# Patient Record
Sex: MALE | Race: WHITE | Employment: OTHER | URBAN - METROPOLITAN AREA
[De-identification: names, ages, dates, MRNs, and addresses within clinical notes are randomized per-mention and may not be internally consistent; named-entity substitution may affect disease eponyms.]

---

## 2017-02-13 ENCOUNTER — HOSPITAL ENCOUNTER (EMERGENCY)
Age: 52
Discharge: LEFT BEFORE DISCHARGE | End: 2017-02-13
Attending: EMERGENCY MEDICINE
Payer: COMMERCIAL

## 2017-02-13 VITALS
HEIGHT: 71 IN | WEIGHT: 271.83 LBS | TEMPERATURE: 98 F | HEART RATE: 84 BPM | RESPIRATION RATE: 16 BRPM | BODY MASS INDEX: 38.06 KG/M2 | DIASTOLIC BLOOD PRESSURE: 70 MMHG | OXYGEN SATURATION: 97 % | SYSTOLIC BLOOD PRESSURE: 155 MMHG

## 2017-02-13 PROCEDURE — 75810000275 HC EMERGENCY DEPT VISIT NO LEVEL OF CARE

## 2018-07-23 ENCOUNTER — APPOINTMENT (OUTPATIENT)
Dept: GENERAL RADIOLOGY | Age: 53
End: 2018-07-23
Attending: EMERGENCY MEDICINE
Payer: COMMERCIAL

## 2018-07-23 ENCOUNTER — HOSPITAL ENCOUNTER (EMERGENCY)
Age: 53
Discharge: HOME | End: 2018-07-23
Attending: EMERGENCY MEDICINE
Payer: COMMERCIAL

## 2018-07-23 ENCOUNTER — APPOINTMENT (OUTPATIENT)
Dept: CT IMAGING | Age: 53
End: 2018-07-23
Attending: EMERGENCY MEDICINE
Payer: COMMERCIAL

## 2018-07-23 VITALS
OXYGEN SATURATION: 96 % | SYSTOLIC BLOOD PRESSURE: 133 MMHG | HEIGHT: 70 IN | BODY MASS INDEX: 44.12 KG/M2 | DIASTOLIC BLOOD PRESSURE: 73 MMHG | WEIGHT: 308.2 LBS | HEART RATE: 77 BPM | TEMPERATURE: 98.1 F | RESPIRATION RATE: 17 BRPM

## 2018-07-23 DIAGNOSIS — R06.00 DYSPNEA, UNSPECIFIED TYPE: Primary | ICD-10-CM

## 2018-07-23 LAB
ALBUMIN SERPL-MCNC: 3.2 G/DL (ref 3.5–5)
ALBUMIN/GLOB SERPL: 0.9 {RATIO} (ref 1.1–2.2)
ALP SERPL-CCNC: 54 U/L (ref 45–117)
ALT SERPL-CCNC: 20 U/L (ref 12–78)
ANION GAP SERPL CALC-SCNC: 5 MMOL/L (ref 5–15)
AST SERPL-CCNC: 11 U/L (ref 15–37)
ATRIAL RATE: 73 BPM
BASOPHILS # BLD: 0 K/UL (ref 0–0.1)
BASOPHILS NFR BLD: 0 % (ref 0–1)
BILIRUB SERPL-MCNC: 0.5 MG/DL (ref 0.2–1)
BNP SERPL-MCNC: 1184 PG/ML (ref 0–125)
BUN SERPL-MCNC: 15 MG/DL (ref 6–20)
BUN/CREAT SERPL: 21 (ref 12–20)
CALCIUM SERPL-MCNC: 8.9 MG/DL (ref 8.5–10.1)
CALCULATED P AXIS, ECG09: 22 DEGREES
CALCULATED R AXIS, ECG10: -10 DEGREES
CALCULATED T AXIS, ECG11: 44 DEGREES
CHLORIDE SERPL-SCNC: 104 MMOL/L (ref 97–108)
CK MB CFR SERPL CALC: NORMAL % (ref 0–2.5)
CK MB SERPL-MCNC: <1 NG/ML (ref 5–25)
CK SERPL-CCNC: 47 U/L (ref 39–308)
CO2 SERPL-SCNC: 30 MMOL/L (ref 21–32)
CREAT SERPL-MCNC: 0.73 MG/DL (ref 0.7–1.3)
D DIMER PPP FEU-MCNC: 1.02 MG/L FEU (ref 0–0.65)
DIAGNOSIS, 93000: NORMAL
DIFFERENTIAL METHOD BLD: ABNORMAL
EOSINOPHIL # BLD: 0.1 K/UL (ref 0–0.4)
EOSINOPHIL NFR BLD: 1 % (ref 0–7)
ERYTHROCYTE [DISTWIDTH] IN BLOOD BY AUTOMATED COUNT: 13.2 % (ref 11.5–14.5)
GLOBULIN SER CALC-MCNC: 3.4 G/DL (ref 2–4)
GLUCOSE SERPL-MCNC: 116 MG/DL (ref 65–100)
HCT VFR BLD AUTO: 35.5 % (ref 36.6–50.3)
HGB BLD-MCNC: 12.1 G/DL (ref 12.1–17)
IMM GRANULOCYTES # BLD: 0 K/UL (ref 0–0.04)
IMM GRANULOCYTES NFR BLD AUTO: 0 % (ref 0–0.5)
LYMPHOCYTES # BLD: 2.9 K/UL (ref 0.8–3.5)
LYMPHOCYTES NFR BLD: 30 % (ref 12–49)
MCH RBC QN AUTO: 30 PG (ref 26–34)
MCHC RBC AUTO-ENTMCNC: 34.1 G/DL (ref 30–36.5)
MCV RBC AUTO: 88.1 FL (ref 80–99)
MONOCYTES # BLD: 0.6 K/UL (ref 0–1)
MONOCYTES NFR BLD: 6 % (ref 5–13)
NEUTS SEG # BLD: 6.3 K/UL (ref 1.8–8)
NEUTS SEG NFR BLD: 63 % (ref 32–75)
NRBC # BLD: 0 K/UL (ref 0–0.01)
NRBC BLD-RTO: 0 PER 100 WBC
P-R INTERVAL, ECG05: 156 MS
PLATELET # BLD AUTO: 322 K/UL (ref 150–400)
PMV BLD AUTO: 9.3 FL (ref 8.9–12.9)
POTASSIUM SERPL-SCNC: 3.9 MMOL/L (ref 3.5–5.1)
PROT SERPL-MCNC: 6.6 G/DL (ref 6.4–8.2)
Q-T INTERVAL, ECG07: 362 MS
QRS DURATION, ECG06: 94 MS
QTC CALCULATION (BEZET), ECG08: 398 MS
RBC # BLD AUTO: 4.03 M/UL (ref 4.1–5.7)
SODIUM SERPL-SCNC: 139 MMOL/L (ref 136–145)
TROPONIN I SERPL-MCNC: <0.05 NG/ML
VENTRICULAR RATE, ECG03: 73 BPM
WBC # BLD AUTO: 10 K/UL (ref 4.1–11.1)

## 2018-07-23 PROCEDURE — 93005 ELECTROCARDIOGRAM TRACING: CPT

## 2018-07-23 PROCEDURE — 71046 X-RAY EXAM CHEST 2 VIEWS: CPT

## 2018-07-23 PROCEDURE — 83880 ASSAY OF NATRIURETIC PEPTIDE: CPT | Performed by: EMERGENCY MEDICINE

## 2018-07-23 PROCEDURE — 80053 COMPREHEN METABOLIC PANEL: CPT | Performed by: EMERGENCY MEDICINE

## 2018-07-23 PROCEDURE — 82553 CREATINE MB FRACTION: CPT | Performed by: EMERGENCY MEDICINE

## 2018-07-23 PROCEDURE — 85025 COMPLETE CBC W/AUTO DIFF WBC: CPT | Performed by: EMERGENCY MEDICINE

## 2018-07-23 PROCEDURE — 85379 FIBRIN DEGRADATION QUANT: CPT | Performed by: EMERGENCY MEDICINE

## 2018-07-23 PROCEDURE — 36415 COLL VENOUS BLD VENIPUNCTURE: CPT | Performed by: EMERGENCY MEDICINE

## 2018-07-23 PROCEDURE — 74011250636 HC RX REV CODE- 250/636: Performed by: EMERGENCY MEDICINE

## 2018-07-23 PROCEDURE — 84484 ASSAY OF TROPONIN QUANT: CPT | Performed by: EMERGENCY MEDICINE

## 2018-07-23 PROCEDURE — 74011636320 HC RX REV CODE- 636/320: Performed by: EMERGENCY MEDICINE

## 2018-07-23 PROCEDURE — 71275 CT ANGIOGRAPHY CHEST: CPT

## 2018-07-23 PROCEDURE — 99285 EMERGENCY DEPT VISIT HI MDM: CPT

## 2018-07-23 RX ORDER — INSULIN ASPART 100 [IU]/ML
INJECTION, SUSPENSION SUBCUTANEOUS
COMMUNITY
End: 2018-11-12

## 2018-07-23 RX ORDER — SODIUM CHLORIDE 0.9 % (FLUSH) 0.9 %
10 SYRINGE (ML) INJECTION
Status: COMPLETED | OUTPATIENT
Start: 2018-07-23 | End: 2018-07-23

## 2018-07-23 RX ORDER — SODIUM CHLORIDE 9 MG/ML
50 INJECTION, SOLUTION INTRAVENOUS
Status: COMPLETED | OUTPATIENT
Start: 2018-07-23 | End: 2018-07-23

## 2018-07-23 RX ADMIN — SODIUM CHLORIDE 50 ML/HR: 900 INJECTION, SOLUTION INTRAVENOUS at 13:01

## 2018-07-23 RX ADMIN — Medication 10 ML: at 13:01

## 2018-07-23 RX ADMIN — IOPAMIDOL 80 ML: 755 INJECTION, SOLUTION INTRAVENOUS at 13:00

## 2018-07-23 NOTE — ED NOTES
SOB and cough with clear sputum  X 3 days. Pt denies CP, N/V/D, Ha/Dz. Pt reports increase in SOB when laying down. Pt is in no obvious distress at this time.

## 2018-07-23 NOTE — ED PROVIDER NOTES
EMERGENCY DEPARTMENT HISTORY AND PHYSICAL EXAM 
 
 
Date: 7/23/2018 Patient Name: Magnolia Avila History of Presenting Illness Chief Complaint Patient presents with  Shortness of Breath Since Friday. Paitent states he laid down and was unable to catch his breath. No chest pain. Sleep apnea test recently done and will be given a CPAP History Provided By: Patient HPI: Magnolia Avila, 47 y.o. male with PMHx significant for DM, Neuropathy, presents ambulatory to the ED with cc of new onset SOB x 3 days worse with laying flat. He states that after 3-4 breaths while laying flat he feels the need to yawn, if he does yawn pt endorses a cough productive of clear sputum. Pt reports his SOB only onset while laying flat and is better when sitting or standing up. Last night pt reports not being able to get comfortable due to the SOB while in bed, he needed to get up and go to the living room and sleep on a chair. He did have a sleep study done at [facility]. While there they did recommend a second sleep study with a CPAP, he was told he likely has sleep apnea. Pt was a previous smoker but did quit in 2007 after 30+ years of smoking. Pt otherwise has no focal complaints and denies any apnea, CP, abdominal pain, nausea, vomiting, diarrhea, dysuria, hematuria, urinary frequency. Social Hx: - Tobacco (former smoker quit after 30+ years), - EtOH (-), + illicit drug use (marijuana) There are no other complaints, changes, or physical findings at this time. PCP: Loren Fuentes, DO 
 
Current Outpatient Prescriptions Medication Sig Dispense Refill  insulin aspart protamine/insulin aspart (NOVOLOG MIX 70-30 U-100 INSULN) 100 unit/mL (70-30) injection by SubCUTAneous route.  lovastatin (MEVACOR) 20 mg tablet TAKE ONE TABLET BY MOUTH NIGHTLY 30 Tab 0  
 metFORMIN (GLUCOPHAGE) 1,000 mg tablet Take 1,000 mg by mouth two (2) times daily (with meals).     
 oxyCODONE-acetaminophen (PERCOCET) 5-325 mg per tablet Take 1 Tab by mouth every four (4) hours as needed for Pain. 20 Tab 0  
 sildenafil citrate (VIAGRA) 50 mg tablet Take 50 mg by mouth as needed.  lisinopril (PRINIVIL, ZESTRIL) 10 mg tablet Take 1 Tab by mouth daily. 30 Tab 1 Past History Past Medical History: 
Past Medical History:  
Diagnosis Date  Diabetes ([facility] [address]  Diabetic neuropathy ([facility] [address] Past Surgical History: 
Past Surgical History:  
Procedure Laterality Date  CARDIAC SURG PROCEDURE UNLIST  2008  
 stent Family History: 
History reviewed. No pertinent family history. Social History: 
Social History Substance Use Topics  Smoking status: Former Smoker Quit date: 2007  Smokeless tobacco: Never Used  Alcohol use No  
 
 
Allergies: 
No Known Allergies Review of Systems Review of Systems Constitutional: Negative for fatigue and fever. HENT: Negative. Eyes: Negative. Respiratory: Positive for cough and shortness of breath. Negative for wheezing. Cardiovascular: Negative for chest pain and leg swelling. Gastrointestinal: Negative for blood in stool, constipation, diarrhea, nausea and vomiting. Endocrine: Negative. Genitourinary: Negative for difficulty urinating and dysuria. Musculoskeletal: Negative. Skin: Negative for rash. Allergic/Immunologic: Negative. Neurological: Negative for weakness and numbness. Hematological: Negative. Psychiatric/Behavioral: Negative. Physical Exam  
Physical Exam  
Constitutional: He is oriented to person, place, and time. He appears well-developed and well-nourished. No distress. HENT:  
Head: Normocephalic and atraumatic. Mouth/Throat: Oropharynx is clear and moist.  
Eyes: Conjunctivae and EOM are normal.  
Neck: Neck supple. No JVD present. No tracheal deviation present. Cardiovascular: Normal rate, regular rhythm and intact distal pulses. Exam reveals no gallop and no friction rub.    
No murmur heard. Pulmonary/Chest: Effort normal and breath sounds normal. No stridor. No respiratory distress. He has no wheezes. Pt is speaking in full sentences and is not in any respiratory distress. Abdominal: Soft. Bowel sounds are normal. He exhibits no distension and no mass. There is no tenderness. There is no guarding. Musculoskeletal: Normal range of motion. He exhibits no edema or tenderness. No deformity Neurological: He is alert and oriented to person, place, and time. He has normal strength. No focal deficits Skin: Skin is warm, dry and intact. No rash noted. Psychiatric: He has a normal mood and affect. His behavior is normal. Judgment and thought content normal.  
Nursing note and vitals reviewed. Diagnostic Study Results Labs - Recent Results (from the past 12 hour(s)) EKG, 12 LEAD, INITIAL Collection Time: 07/23/18  9:02 AM  
Result Value Ref Range Ventricular Rate 73 BPM  
 Atrial Rate 73 BPM  
 P-R Interval 156 ms QRS Duration 94 ms Q-T Interval 362 ms QTC Calculation (Bezet) 398 ms Calculated P Axis 22 degrees Calculated R Axis -10 degrees Calculated T Axis 44 degrees Diagnosis Normal sinus rhythm Normal ECG When compared with ECG of 08-MAR-2013 18:17, 
Questionable change in QRS axis Confirmed by Александр Ruiz ([id number]) on 7/23/2018 37:53:04 PM 
  
METABOLIC PANEL, COMPREHENSIVE Collection Time: 07/23/18  9:54 AM  
Result Value Ref Range Sodium 139 136 - 145 mmol/L Potassium 3.9 3.5 - 5.1 mmol/L Chloride 104 97 - 108 mmol/L  
 CO2 30 21 - 32 mmol/L Anion gap 5 5 - 15 mmol/L Glucose 116 (H) 65 - 100 mg/dL BUN 15 6 - 20 MG/DL Creatinine 0.73 0.70 - 1.30 MG/DL  
 BUN/Creatinine ratio 21 (H) 12 - 20 GFR est AA >60 >60 ml/min/1.73m2 GFR est non-AA >60 >60 ml/min/1.73m2 Calcium 8.9 8.5 - 10.1 MG/DL  Bilirubin, total 0.5 0.2 - 1.0 MG/DL  
 ALT (SGPT) 20 12 - 78 U/L  
 AST (SGOT) 11 (L) 15 - 37 U/L  
 Alk. phosphatase 54 45 - 117 U/L Protein, total 6.6 6.4 - 8.2 g/dL Albumin 3.2 (L) 3.5 - 5.0 g/dL Globulin 3.4 2.0 - 4.0 g/dL A-G Ratio 0.9 (L) 1.1 - 2.2 CK W/ CKMB & INDEX Collection Time: 07/23/18  9:54 AM  
Result Value Ref Range CK 47 39 - 308 U/L  
 CK - MB <1.0 <3.6 NG/ML  
 CK-MB Index Cannot be calculated 0 - 2.5    
CBC WITH AUTOMATED DIFF Collection Time: 07/23/18  9:54 AM  
Result Value Ref Range WBC 10.0 4.1 - 11.1 K/uL  
 RBC 4.03 (L) 4.10 - 5.70 M/uL  
 HGB 12.1 12.1 - 17.0 g/dL HCT 35.5 (L) 36.6 - 50.3 % MCV 88.1 80.0 - 99.0 FL  
 MCH 30.0 26.0 - 34.0 PG  
 MCHC 34.1 30.0 - 36.5 g/dL  
 RDW 13.2 11.5 - 14.5 % PLATELET 796 767 - 510 K/uL MPV 9.3 8.9 - 12.9 FL  
 NRBC 0.0 0  WBC ABSOLUTE NRBC 0.00 0.00 - 0.01 K/uL NEUTROPHILS 63 32 - 75 % LYMPHOCYTES 30 12 - 49 % MONOCYTES 6 5 - 13 % EOSINOPHILS 1 0 - 7 % BASOPHILS 0 0 - 1 % IMMATURE GRANULOCYTES 0 0.0 - 0.5 % ABS. NEUTROPHILS 6.3 1.8 - 8.0 K/UL  
 ABS. LYMPHOCYTES 2.9 0.8 - 3.5 K/UL  
 ABS. MONOCYTES 0.6 0.0 - 1.0 K/UL  
 ABS. EOSINOPHILS 0.1 0.0 - 0.4 K/UL  
 ABS. BASOPHILS 0.0 0.0 - 0.1 K/UL  
 ABS. IMM. GRANS. 0.0 0.00 - 0.04 K/UL  
 DF AUTOMATED    
TROPONIN I Collection Time: 07/23/18  9:54 AM  
Result Value Ref Range Troponin-I, Qt. <0.05 <0.05 ng/mL NT-PRO BNP Collection Time: 07/23/18  9:54 AM  
Result Value Ref Range NT pro-BNP 1184 (H) 0 - 125 PG/ML  
D DIMER Collection Time: 07/23/18  9:54 AM  
Result Value Ref Range D-dimer 1.02 (H) 0.00 - 0.65 mg/L FEU Radiologic Studies -  
CT Results  (Last 48 hours) 07/23/18 1300  CTA CHEST W OR W WO CONT Final result Impression:  IMPRESSION: No acute finding or pulmonary embolus. . Incidental right adrenal  
adenoma. Narrative:  EXAM:  CTA CHEST W OR W WO CONT INDICATION:   Chest pain, acute, pulmonary embolism (PE) suspected COMPARISON: None. CONTRAST:  80 mL of Isovue-370. TECHNIQUE:   
Precontrast  images were obtained to localize the volume for acquisition. Multislice helical CT arteriography was performed from the diaphragm to the  
thoracic inlet during uneventful rapid bolus intravenous contrast  
administration. Lung and soft tissue windows were generated. Coronal and  
sagittal images were generated and 3D post processing consisting of coronal  
maximum intensity images was performed. CT dose reduction was achieved through  
use of a standardized protocol tailored for this examination and automatic  
exposure control for dose modulation. FINDINGS:  
LUNGS:  The lungs are clear of mass, nodule, airspace disease or edema. PLEURA: No pleural effusion or pneumothorax. TRACHEA/BRONCHI: Patent. PULMONARY ARTERIES: The pulmonary arteries are well enhanced and no pulmonary  
emboli are identified. MEDIASTINUM/MERCEDES: There are calcified lymph nodes in the subcarinal region and  
in the left hilus. Marcy Ku AORTA: The aorta enhances normally without evidence of aneurysm or dissection. UPPER ABDOMEN: A 3.4 cm low-density adenoma is noted in the right adrenal  
gland. Marcy Ku BONES: No sclerotic or lytic lesion. CXR Results  (Last 48 hours) 07/23/18 1033  XR CHEST PA LAT Final result Impression:  Impression: No acute process. Narrative:  Exam:  2 view chest  
   
Indication: Dyspnea, shortness of breath,  
   
COMPARISON: 5/4/2008 PA and lateral views demonstrate normal heart size. The patient is on a cardiac  
monitor. There is no acute process in the lung fields. The osseous structures  
are unremarkable. Medical Decision Making I am the first provider for this patient. I reviewed the vital signs, available nursing notes, past medical history, past surgical history, family history and social history.  
 
Vital Signs-Reviewed the patient's vital signs. Patient Vitals for the past 12 hrs: 
 Temp Pulse Resp BP SpO2  
07/23/18 1317 - 77 17 - 96 %  
07/23/18 1316 - - - 133/73 -  
07/23/18 1130 - 74 20 164/83 96 %  
07/23/18 1100 - 72 18 161/69 94 %  
07/23/18 1015 - 74 20 157/80 94 %  
07/23/18 1000 - 80 18 165/75 92 %  
07/23/18 0945 - 75 21 (!) 166/92 95 %  
07/23/18 0930 - 83 21 172/81 94 %  
07/23/18 0915 - 78 24 170/85 96 %  
07/23/18 0908 - - - - 99 % 07/23/18 0857 98.1 °F (36.7 °C) 87 18 (!) 206/112 98 % EKG interpretation: (Preliminary) 9:02 AM 
Rhythm: normal sinus rhythm; and regular . Rate (approx.): 73; Axis: left axis deviation; TX interval: normal; QRS interval: normal ; ST/T wave: No ST changes. Written by Freeman Wheatley ED Scribe, as dictated by Yoandy Wolfe DO. Records Reviewed: Nursing Notes, Old Medical Records, Previous Radiology Studies and Previous Laboratory Studies Provider Notes (Medical Decision Making): DDx: COPD exacerbation, Pulmonary edema, ACS, Arrhythmia, PNA. ED Course:  
Initial assessment performed. The patients presenting problems have been discussed, and they are in agreement with the care plan formulated and outlined with them. I have encouraged them to ask questions as they arise throughout their visit. Critical Care Time:  
None. Disposition: 
DISCHARGE NOTE 
1:23 PM 
The patient has been re-evaluated and is ready for discharge. Reviewed available results with patient. Counseled pt on diagnosis and care plan. Pt has expressed understanding, and all questions have been answered. Pt agrees with plan and agrees to F/U as recommended, or return to the ED if their sxs worsen. Discharge instructions have been provided and explained to the pt, along with reasons to return to the ED. PLAN: 
1. Discharge Medication List as of 7/23/2018  1:23 PM  
  
 
2. Follow-up Information Follow up With Details Comments Contact Info  Saad Ames DO Schedule an appointment as soon as possible for a visit  [address] [place] [address] [phone] [facility] EMERGENCY DEPT  As needed, If symptoms worsen [address] [facility] 
[phone] Return to ED if worse Diagnosis Clinical Impression: 1. Dyspnea, unspecified type Attestations: This note is prepared by Brad Chopra acting as Scribe for DO Tari Marc Cha, Cha, DO : The scribe's documentation has been prepared under my direction and personally reviewed by me in its entirety. I confirm that the note above accurately reflects all work, treatment, procedures, and medical decision making performed by me.

## 2018-07-23 NOTE — ED NOTES
Dr Roshan Reynoso reviewed discharge instructions with the patient. The patient verbalized understanding. All questions and concerns were addressed. The patient declined a wheelchair and is discharged ambulatory in the care of family members with instructions and prescriptions in hand. Pt is alert and oriented x 4. Respirations are clear and unlabored.

## 2018-07-23 NOTE — DISCHARGE INSTRUCTIONS

## 2018-10-23 ENCOUNTER — APPOINTMENT (OUTPATIENT)
Dept: CARDIAC REHAB | Age: 53
End: 2018-10-23

## 2018-11-12 ENCOUNTER — HOSPITAL ENCOUNTER (OUTPATIENT)
Dept: CARDIAC REHAB | Age: 53
Discharge: HOME | End: 2018-11-12
Payer: SELF-PAY

## 2018-11-12 VITALS — BODY MASS INDEX: 43.83 KG/M2 | WEIGHT: 305.5 LBS

## 2018-11-12 PROCEDURE — 93798 PHYS/QHP OP CAR RHAB W/ECG: CPT

## 2018-11-12 RX ORDER — FUROSEMIDE 20 MG/1
20 TABLET ORAL AS NEEDED
COMMUNITY

## 2018-11-12 RX ORDER — METOPROLOL SUCCINATE 25 MG/1
25 TABLET, EXTENDED RELEASE ORAL DAILY
COMMUNITY

## 2018-11-12 RX ORDER — CLOPIDOGREL BISULFATE 75 MG/1
75 TABLET ORAL DAILY
COMMUNITY

## 2018-11-12 NOTE — CARDIO/PULMONARY
Cardiopulmonary Rehab Intake Note:  Met with Magdy Stephanie Ramon Grimaldo for the initial session. Mr. Srinivasan Vargas is a 47year old patient of Dr. Radha Tracey and Dr. 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 LVEF within normal range. PMH significant for DM, HTN, diabetic peripheral neuropathy with amputation of two toes of his right foot.      Lungs were clear to auscultation. Has occasional cough with white sputum. No BLE edema. Well healing sternal incision. Limitations to exercise are primarily related to deconditioning, obesity, diabetic neuropathy.       He has been doing minimal walking for exercise since his surgery. Pt unable to do six minute walk on intake due to high blood pressure.           Psychosocial: Pt scored 7 on PHQ9. He is on Lexapro but pt states it is for diabetic neuropathy and not depression. He denies depression. He denies stress. He is disabled. He used to be a . He uses edible marijuana. He also uses Percocet routinely. His mother  recently. He does have a supportive wife. Patient was given an overview of cardiac rehab program, placement of electrode/leads, and rationale for program. Patient viewed the cardiac rehab DVD and an education notebook was given.       Heart rhythm on the monitor was a normal sinus rhythm  Oxygen saturation was 97% on room air. BMI 42.7  Patient's identified goals are:  1. To walk for exercise at home 3-4 days a week for 15-20 minutes. 2. To lose 10-15 pounds. 3.  To adhere to low sodium and diabetic diets. 4.  To run on a treadmill eventually.

## 2018-11-14 NOTE — PROGRESS NOTES
Patient presented to cardiac rehab with elevated blood pressure. Readings 219/103, 180/90, 190/90 and 195/80. Talked to patient and he will call cardiologist today.

## 2018-11-16 ENCOUNTER — APPOINTMENT (OUTPATIENT)
Dept: CARDIAC REHAB | Age: 53
End: 2018-11-16
Payer: SELF-PAY

## 2018-11-16 ENCOUNTER — APPOINTMENT (OUTPATIENT)
Dept: CARDIAC REHAB | Age: 53
End: 2018-11-16

## 2018-11-21 ENCOUNTER — APPOINTMENT (OUTPATIENT)
Dept: CARDIAC REHAB | Age: 53
End: 2018-11-21
Payer: SELF-PAY

## 2018-11-28 ENCOUNTER — HOSPITAL ENCOUNTER (OUTPATIENT)
Dept: CARDIAC REHAB | Age: 53
End: 2018-11-28
Payer: SELF-PAY

## 2018-11-28 ENCOUNTER — TELEPHONE (OUTPATIENT)
Dept: CARDIAC REHAB | Age: 53
End: 2018-11-28

## 2018-11-28 NOTE — TELEPHONE ENCOUNTER
Spoke with patient today about his elevated BP and plan to return to rehab. Patient saw his cardiologist and his medications have been adjusted. For the last two days patient reports that his BP has been 165/90 and 145/90. He plans to return to rehab Monday.

## 2018-11-30 ENCOUNTER — APPOINTMENT (OUTPATIENT)
Dept: CARDIAC REHAB | Age: 53
End: 2018-11-30

## 2018-12-05 ENCOUNTER — HOSPITAL ENCOUNTER (OUTPATIENT)
Dept: CARDIAC REHAB | Age: 53
Discharge: HOME | End: 2018-12-05
Payer: SELF-PAY

## 2018-12-05 VITALS — BODY MASS INDEX: 42.84 KG/M2 | WEIGHT: 298.6 LBS

## 2018-12-07 ENCOUNTER — HOSPITAL ENCOUNTER (OUTPATIENT)
Dept: CARDIAC REHAB | Age: 53
Discharge: HOME | End: 2018-12-07
Payer: SELF-PAY

## 2018-12-07 VITALS — BODY MASS INDEX: 43.33 KG/M2 | WEIGHT: 302 LBS

## 2018-12-12 ENCOUNTER — HOSPITAL ENCOUNTER (OUTPATIENT)
Dept: CARDIAC REHAB | Age: 53
End: 2018-12-12
Payer: SELF-PAY

## 2018-12-14 ENCOUNTER — HOSPITAL ENCOUNTER (OUTPATIENT)
Dept: CARDIAC REHAB | Age: 53
Discharge: HOME | End: 2018-12-14
Payer: SELF-PAY

## 2018-12-14 VITALS — WEIGHT: 301 LBS | BODY MASS INDEX: 43.19 KG/M2

## 2018-12-19 ENCOUNTER — APPOINTMENT (OUTPATIENT)
Dept: CARDIAC REHAB | Age: 53
End: 2018-12-19
Payer: SELF-PAY

## 2018-12-21 ENCOUNTER — APPOINTMENT (OUTPATIENT)
Dept: CARDIAC REHAB | Age: 53
End: 2018-12-21
Payer: SELF-PAY

## 2018-12-26 ENCOUNTER — APPOINTMENT (OUTPATIENT)
Dept: CARDIAC REHAB | Age: 53
End: 2018-12-26
Payer: SELF-PAY

## 2018-12-28 ENCOUNTER — APPOINTMENT (OUTPATIENT)
Dept: CARDIAC REHAB | Age: 53
End: 2018-12-28
Payer: SELF-PAY

## 2018-12-31 ENCOUNTER — APPOINTMENT (OUTPATIENT)
Dept: CARDIAC REHAB | Age: 53
End: 2018-12-31
Payer: SELF-PAY

## 2019-01-04 ENCOUNTER — TELEPHONE (OUTPATIENT)
Dept: CARDIAC REHAB | Age: 54
End: 2019-01-04

## 2019-01-04 NOTE — TELEPHONE ENCOUNTER
Left a message for patient about completing his HH month with Cardiac Rehab. Asked patient to please let us know if he would like to complete his month.

## 2019-01-11 ENCOUNTER — TELEPHONE (OUTPATIENT)
Dept: CARDIAC REHAB | Age: 54
End: 2019-01-11

## 2019-01-11 ENCOUNTER — APPOINTMENT (OUTPATIENT)
Dept: CARDIAC REHAB | Age: 54
End: 2019-01-11

## 2019-01-11 NOTE — TELEPHONE ENCOUNTER
Patient was called on January 4, 2019 about his desire to complete the [facility] program. No return call so patient's chart broken down.

## 2019-01-14 ENCOUNTER — APPOINTMENT (OUTPATIENT)
Dept: CARDIAC REHAB | Age: 54
End: 2019-01-14

## 2019-01-16 ENCOUNTER — APPOINTMENT (OUTPATIENT)
Dept: CARDIAC REHAB | Age: 54
End: 2019-01-16

## 2019-01-18 ENCOUNTER — APPOINTMENT (OUTPATIENT)
Dept: CARDIAC REHAB | Age: 54
End: 2019-01-18

## 2019-01-21 ENCOUNTER — APPOINTMENT (OUTPATIENT)
Dept: CARDIAC REHAB | Age: 54
End: 2019-01-21

## 2019-01-23 ENCOUNTER — APPOINTMENT (OUTPATIENT)
Dept: CARDIAC REHAB | Age: 54
End: 2019-01-23

## 2019-01-25 ENCOUNTER — APPOINTMENT (OUTPATIENT)
Dept: CARDIAC REHAB | Age: 54
End: 2019-01-25

## 2019-01-28 ENCOUNTER — APPOINTMENT (OUTPATIENT)
Dept: CARDIAC REHAB | Age: 54
End: 2019-01-28

## 2019-01-30 ENCOUNTER — APPOINTMENT (OUTPATIENT)
Dept: CARDIAC REHAB | Age: 54
End: 2019-01-30

## 2019-02-01 ENCOUNTER — APPOINTMENT (OUTPATIENT)
Dept: CARDIAC REHAB | Age: 54
End: 2019-02-01

## 2019-06-27 ENCOUNTER — HOSPITAL ENCOUNTER (EMERGENCY)
Age: 54
Discharge: HOME | End: 2019-06-27
Attending: EMERGENCY MEDICINE
Payer: MEDICARE

## 2019-06-27 VITALS
TEMPERATURE: 97.8 F | DIASTOLIC BLOOD PRESSURE: 87 MMHG | HEART RATE: 82 BPM | WEIGHT: 315 LBS | HEIGHT: 69 IN | BODY MASS INDEX: 46.65 KG/M2 | OXYGEN SATURATION: 95 % | RESPIRATION RATE: 18 BRPM | SYSTOLIC BLOOD PRESSURE: 145 MMHG

## 2019-06-27 DIAGNOSIS — K85.90 ACUTE PANCREATITIS, UNSPECIFIED COMPLICATION STATUS, UNSPECIFIED PANCREATITIS TYPE: Primary | ICD-10-CM

## 2019-06-27 LAB
ALBUMIN SERPL-MCNC: 3.4 G/DL (ref 3.5–5)
ALBUMIN/GLOB SERPL: 0.9 {RATIO} (ref 1.1–2.2)
ALP SERPL-CCNC: 74 U/L (ref 45–117)
ALT SERPL-CCNC: 29 U/L (ref 12–78)
ANION GAP SERPL CALC-SCNC: 5 MMOL/L (ref 5–15)
APPEARANCE UR: CLEAR
AST SERPL-CCNC: 22 U/L (ref 15–37)
BACTERIA URNS QL MICRO: NEGATIVE /HPF
BASOPHILS # BLD: 0 K/UL (ref 0–0.1)
BASOPHILS NFR BLD: 0 % (ref 0–1)
BILIRUB SERPL-MCNC: 0.5 MG/DL (ref 0.2–1)
BILIRUB UR QL: NEGATIVE
BUN SERPL-MCNC: 24 MG/DL (ref 6–20)
BUN/CREAT SERPL: 19 (ref 12–20)
CALCIUM SERPL-MCNC: 8.9 MG/DL (ref 8.5–10.1)
CHLORIDE SERPL-SCNC: 102 MMOL/L (ref 97–108)
CO2 SERPL-SCNC: 31 MMOL/L (ref 21–32)
COLOR UR: ABNORMAL
CREAT SERPL-MCNC: 1.24 MG/DL (ref 0.7–1.3)
DIFFERENTIAL METHOD BLD: ABNORMAL
EOSINOPHIL # BLD: 0.1 K/UL (ref 0–0.4)
EOSINOPHIL NFR BLD: 1 % (ref 0–7)
EPITH CASTS URNS QL MICRO: ABNORMAL /LPF
ERYTHROCYTE [DISTWIDTH] IN BLOOD BY AUTOMATED COUNT: 14 % (ref 11.5–14.5)
GLOBULIN SER CALC-MCNC: 3.9 G/DL (ref 2–4)
GLUCOSE BLD STRIP.AUTO-MCNC: 243 MG/DL (ref 65–100)
GLUCOSE SERPL-MCNC: 252 MG/DL (ref 65–100)
GLUCOSE UR STRIP.AUTO-MCNC: 500 MG/DL
HCT VFR BLD AUTO: 41.4 % (ref 36.6–50.3)
HGB BLD-MCNC: 13.5 G/DL (ref 12.1–17)
HGB UR QL STRIP: NEGATIVE
HYALINE CASTS URNS QL MICRO: ABNORMAL /LPF (ref 0–5)
IMM GRANULOCYTES # BLD AUTO: 0 K/UL (ref 0–0.04)
IMM GRANULOCYTES NFR BLD AUTO: 0 % (ref 0–0.5)
KETONES UR QL STRIP.AUTO: NEGATIVE MG/DL
LEUKOCYTE ESTERASE UR QL STRIP.AUTO: NEGATIVE
LIPASE SERPL-CCNC: 621 U/L (ref 73–393)
LYMPHOCYTES # BLD: 2.3 K/UL (ref 0.8–3.5)
LYMPHOCYTES NFR BLD: 23 % (ref 12–49)
MCH RBC QN AUTO: 28.4 PG (ref 26–34)
MCHC RBC AUTO-ENTMCNC: 32.6 G/DL (ref 30–36.5)
MCV RBC AUTO: 87.2 FL (ref 80–99)
MONOCYTES # BLD: 0.7 K/UL (ref 0–1)
MONOCYTES NFR BLD: 7 % (ref 5–13)
MUCOUS THREADS URNS QL MICRO: ABNORMAL /LPF
NEUTS SEG # BLD: 6.8 K/UL (ref 1.8–8)
NEUTS SEG NFR BLD: 69 % (ref 32–75)
NITRITE UR QL STRIP.AUTO: NEGATIVE
NRBC # BLD: 0 K/UL (ref 0–0.01)
NRBC BLD-RTO: 0 PER 100 WBC
PH UR STRIP: 5.5 [PH] (ref 5–8)
PLATELET # BLD AUTO: 504 K/UL (ref 150–400)
PMV BLD AUTO: 9.3 FL (ref 8.9–12.9)
POTASSIUM SERPL-SCNC: 4.5 MMOL/L (ref 3.5–5.1)
PROT SERPL-MCNC: 7.3 G/DL (ref 6.4–8.2)
PROT UR STRIP-MCNC: 100 MG/DL
RBC # BLD AUTO: 4.75 M/UL (ref 4.1–5.7)
RBC #/AREA URNS HPF: ABNORMAL /HPF (ref 0–5)
SERVICE CMNT-IMP: ABNORMAL
SODIUM SERPL-SCNC: 138 MMOL/L (ref 136–145)
SP GR UR REFRACTOMETRY: 1.03 (ref 1–1.03)
TROPONIN I SERPL-MCNC: <0.05 NG/ML
UA: UC IF INDICATED,UAUC: ABNORMAL
UROBILINOGEN UR QL STRIP.AUTO: 0.2 EU/DL (ref 0.2–1)
WBC # BLD AUTO: 10 K/UL (ref 4.1–11.1)
WBC URNS QL MICRO: ABNORMAL /HPF (ref 0–4)

## 2019-06-27 PROCEDURE — 99284 EMERGENCY DEPT VISIT MOD MDM: CPT

## 2019-06-27 PROCEDURE — 80053 COMPREHEN METABOLIC PANEL: CPT

## 2019-06-27 PROCEDURE — 96361 HYDRATE IV INFUSION ADD-ON: CPT

## 2019-06-27 PROCEDURE — 36415 COLL VENOUS BLD VENIPUNCTURE: CPT

## 2019-06-27 PROCEDURE — 83690 ASSAY OF LIPASE: CPT

## 2019-06-27 PROCEDURE — 85025 COMPLETE CBC W/AUTO DIFF WBC: CPT

## 2019-06-27 PROCEDURE — 84484 ASSAY OF TROPONIN QUANT: CPT

## 2019-06-27 PROCEDURE — 81001 URINALYSIS AUTO W/SCOPE: CPT

## 2019-06-27 PROCEDURE — 96374 THER/PROPH/DIAG INJ IV PUSH: CPT

## 2019-06-27 PROCEDURE — 74011250636 HC RX REV CODE- 250/636: Performed by: EMERGENCY MEDICINE

## 2019-06-27 PROCEDURE — 82962 GLUCOSE BLOOD TEST: CPT

## 2019-06-27 RX ORDER — ONDANSETRON 4 MG/1
4 TABLET, ORALLY DISINTEGRATING ORAL
Qty: 10 TAB | Refills: 0 | Status: SHIPPED | OUTPATIENT
Start: 2019-06-27 | End: 2021-05-21 | Stop reason: ALTCHOICE

## 2019-06-27 RX ORDER — ONDANSETRON 2 MG/ML
4 INJECTION INTRAMUSCULAR; INTRAVENOUS
Status: COMPLETED | OUTPATIENT
Start: 2019-06-27 | End: 2019-06-27

## 2019-06-27 RX ADMIN — ONDANSETRON 4 MG: 2 INJECTION INTRAMUSCULAR; INTRAVENOUS at 08:14

## 2019-06-27 RX ADMIN — SODIUM CHLORIDE 1000 ML: 900 INJECTION, SOLUTION INTRAVENOUS at 08:14

## 2019-06-27 NOTE — ED PROVIDER NOTES
EMERGENCY DEPARTMENT HISTORY AND PHYSICAL EXAM      Date: 6/27/2019  Patient Name: Katia Zelaya    History of Presenting Illness     Chief Complaint   Patient presents with    Abdominal Pain     pt states that he has been having abdominal pain bilaterally along with nausea and vomiting for the last twenty four hours. pt states he has not been able to keep anything down       History Provided By: Patient    HPI: Katia Zelaya, 49 y.o. male with PMHx significant for coronary artery disease status post CABG, diabetes, hypertension, who presents the chief complaint of vomiting. Patient states that yesterday he was working outside for several hours in the heat. Afterwards felt weak and was vomiting. Also notes that he felt quite nauseous but only when he was lying down. Says when he got up this morning had another episode of vomiting for a total of 3 episodes. Also notes some burning in his upper abdomen that only occurs with movement in certain directions. Did not take anything at home for his symptoms. Has had ongoing issues with diarrhea, however states this is not changed. States that his blood sugar has been running on the higher side. Notes that it was 270 this morning. He denies any chest pain, shortness of breath, fever, urinary symptoms. PCP: Jose Salvador, DO    There are no other complaints, changes, or physical findings at this time. Current Outpatient Medications   Medication Sig Dispense Refill    ondansetron (ZOFRAN ODT) 4 mg disintegrating tablet Take 1 Tab by mouth every eight (8) hours as needed for Nausea. 10 Tab 0    metoprolol succinate (TOPROL-XL) 25 mg XL tablet Take 25 mg by mouth daily.  clopidogrel (PLAVIX) 75 mg tab Take 75 mg by mouth daily.  furosemide (LASIX) 20 mg tablet Take 20 mg by mouth as needed.  insulin glargine-lixisenatide (SOLIQUA 100/33) 100 unit-33 mcg/mL inpn 33 Units by SubCUTAneous route daily.  Use 33 to 44 units daily      lovastatin (MEVACOR) 20 mg tablet TAKE ONE TABLET BY MOUTH NIGHTLY 30 Tab 0    metFORMIN (GLUCOPHAGE) 1,000 mg tablet Take 1,000 mg by mouth two (2) times daily (with meals).  oxyCODONE-acetaminophen (PERCOCET) 5-325 mg per tablet Take 1 Tab by mouth every four (4) hours as needed for Pain. 20 Tab 0    sildenafil citrate (VIAGRA) 50 mg tablet Take 50 mg by mouth as needed.  lisinopril (PRINIVIL, ZESTRIL) 10 mg tablet Take 1 Tab by mouth daily. (Patient taking differently: Take 20 mg by mouth two (2) times a day.) 30 Tab 1     Past History     Past Medical History:  Past Medical History:   Diagnosis Date    CAD (coronary artery disease)     [facility] [address] [facility]     Diabetic neuropathy ([facility] [address] [facility]     Hypertension     Ill-defined condition     Diabetic peripheral neuropathy     Past Surgical History:  Past Surgical History:   Procedure Laterality Date    CABG, ARTERY-VEIN, THREE  2018    CABG    CARDIAC SURG PROCEDURE UNLIST      stent    HX OTHER SURGICAL      two toes amputated from right foot     Family History:  History reviewed. No pertinent family history. Social History:  Social History     Tobacco Use    Smoking status: Former Smoker     Last attempt to quit:      Years since quittin.4    Smokeless tobacco: Never Used   Substance Use Topics    Alcohol use: No    Drug use: Yes     Frequency: 5.0 times per week     Types: Marijuana     Comment: Edibles     Allergies:  No Known Allergies  Review of Systems   Review of Systems   Constitutional: Negative for chills and fever. HENT: Negative for congestion, rhinorrhea and sore throat. Respiratory: Negative for cough and shortness of breath. Cardiovascular: Negative for chest pain. Gastrointestinal: Positive for diarrhea, nausea and vomiting. Negative for abdominal pain. Genitourinary: Negative for dysuria and urgency. Skin: Negative for rash. Neurological: Negative for dizziness, light-headedness and headaches.    All other systems reviewed and are negative. Physical Exam   Physical Exam   Constitutional: He is oriented to person, place, and time. He appears well-developed and well-nourished. No distress. HENT:   Head: Normocephalic and atraumatic. Eyes: Pupils are equal, round, and reactive to light. Conjunctivae and EOM are normal.   Neck: Normal range of motion. Cardiovascular: Normal rate, regular rhythm and intact distal pulses. Pulmonary/Chest: Effort normal and breath sounds normal. No stridor. No respiratory distress. Abdominal: Soft. He exhibits no distension. There is no tenderness. Musculoskeletal: Normal range of motion. Neurological: He is alert and oriented to person, place, and time. Skin: Skin is warm and dry. Psychiatric: He has a normal mood and affect. Nursing note and vitals reviewed. Diagnostic Study Results   Labs -     Recent Results (from the past 12 hour(s))   CBC WITH AUTOMATED DIFF    Collection Time: 06/27/19  7:46 AM   Result Value Ref Range    WBC 10.0 4.1 - 11.1 K/uL    RBC 4.75 4.10 - 5.70 M/uL    HGB 13.5 12.1 - 17.0 g/dL    HCT 41.4 36.6 - 50.3 %    MCV 87.2 80.0 - 99.0 FL    MCH 28.4 26.0 - 34.0 PG    MCHC 32.6 30.0 - 36.5 g/dL    RDW 14.0 11.5 - 14.5 %    PLATELET 291 (H) 083 - 400 K/uL    MPV 9.3 8.9 - 12.9 FL    NRBC 0.0 0  WBC    ABSOLUTE NRBC 0.00 0.00 - 0.01 K/uL    NEUTROPHILS 69 32 - 75 %    LYMPHOCYTES 23 12 - 49 %    MONOCYTES 7 5 - 13 %    EOSINOPHILS 1 0 - 7 %    BASOPHILS 0 0 - 1 %    IMMATURE GRANULOCYTES 0 0.0 - 0.5 %    ABS. NEUTROPHILS 6.8 1.8 - 8.0 K/UL    ABS. LYMPHOCYTES 2.3 0.8 - 3.5 K/UL    ABS. MONOCYTES 0.7 0.0 - 1.0 K/UL    ABS. EOSINOPHILS 0.1 0.0 - 0.4 K/UL    ABS. BASOPHILS 0.0 0.0 - 0.1 K/UL    ABS. IMM.  GRANS. 0.0 0.00 - 0.04 K/UL    DF AUTOMATED     METABOLIC PANEL, COMPREHENSIVE    Collection Time: 06/27/19  7:46 AM   Result Value Ref Range    Sodium 138 136 - 145 mmol/L    Potassium 4.5 3.5 - 5.1 mmol/L    Chloride 102 97 - 108 mmol/L    CO2 31 21 - 32 mmol/L    Anion gap 5 5 - 15 mmol/L    Glucose 252 (H) 65 - 100 mg/dL    BUN 24 (H) 6 - 20 MG/DL    Creatinine 1.24 0.70 - 1.30 MG/DL    BUN/Creatinine ratio 19 12 - 20      GFR est AA >60 >60 ml/min/1.73m2    GFR est non-AA >60 >60 ml/min/1.73m2    Calcium 8.9 8.5 - 10.1 MG/DL    Bilirubin, total 0.5 0.2 - 1.0 MG/DL    ALT (SGPT) 29 12 - 78 U/L    AST (SGOT) 22 15 - 37 U/L    Alk. phosphatase 74 45 - 117 U/L    Protein, total 7.3 6.4 - 8.2 g/dL    Albumin 3.4 (L) 3.5 - 5.0 g/dL    Globulin 3.9 2.0 - 4.0 g/dL    A-G Ratio 0.9 (L) 1.1 - 2.2     LIPASE    Collection Time: 06/27/19  7:46 AM   Result Value Ref Range    Lipase 621 (H) 73 - 393 U/L   TROPONIN I    Collection Time: 06/27/19  7:46 AM   Result Value Ref Range    Troponin-I, Qt. <0.05 <0.05 ng/mL   GLUCOSE, POC    Collection Time: 06/27/19  8:13 AM   Result Value Ref Range    Glucose (POC) 243 (H) 65 - 100 mg/dL    Performed by Ambreen Gallegos (PCT)    URINALYSIS W/ REFLEX CULTURE    Collection Time: 06/27/19  9:19 AM   Result Value Ref Range    Color YELLOW/STRAW      Appearance CLEAR CLEAR      Specific gravity 1.030 1.003 - 1.030      pH (UA) 5.5 5.0 - 8.0      Protein 100 (A) NEG mg/dL    Glucose 500 (A) NEG mg/dL    Ketone NEGATIVE  NEG mg/dL    Bilirubin NEGATIVE  NEG      Blood NEGATIVE  NEG      Urobilinogen 0.2 0.2 - 1.0 EU/dL    Nitrites NEGATIVE  NEG      Leukocyte Esterase NEGATIVE  NEG      WBC 0-4 0 - 4 /hpf    RBC 0-5 0 - 5 /hpf    Epithelial cells FEW FEW /lpf    Bacteria NEGATIVE  NEG /hpf    UA:UC IF INDICATED CULTURE NOT INDICATED BY UA RESULT CNI      Mucus 2+ (A) NEG /lpf    Hyaline cast 2-5 0 - 5 /lpf       Radiologic Studies -   No orders to display     No results found. Medical Decision Making   I am the first provider for this patient. I reviewed the vital signs, available nursing notes, past medical history, past surgical history, family history and social history.     Vital Signs-Reviewed the patient's vital signs. Patient Vitals for the past 12 hrs:   Temp Pulse Resp BP SpO2   06/27/19 1000  82  145/87 95 %   06/27/19 0930  85  144/81 95 %   06/27/19 0915  85  (!) 160/91 96 %   06/27/19 0845  84  146/84 92 %   06/27/19 0830  85  134/64 95 %   06/27/19 0800  85  140/81 96 %   06/27/19 0742 97.8 °F (36.6 °C) 90 18 111/81 94 %       Pulse Oximetry Analysis - 95% on RA    Records Reviewed: Nursing Notes and Old Medical Records    Provider Notes (Medical Decision Making):   Ddx: Dehydration, electrolyte imbalance, gastroneuritis, pancreatitis, DKA    Plan for basic labs, urinalysis, IV fluids, nausea medication, reevaluation    ED Course:   Initial assessment performed. The patients presenting problems have been discussed, and they are in agreement with the care plan formulated and outlined with them. I have encouraged them to ask questions as they arise throughout their visit. ED Course as of Jun 27 1621   Thu Jun 27, 2019   1011 Pt tolerated PO, feeling better    [NIKKI]      ED Course User Index  Ayd Gtz MD     Labs show mildly elevated lipase, discussed with patient and clear liquid diet and gradually advancing at home. Will write for nausea medication at home. Advised that if he has severely worsening abdominal pain, is unable to tolerate p.o., or develops high fevers he should return to the emergency department. Critical Care:  none    Disposition:  Discharge Note:  10:13 AM  The patient has been re-evaluated and is ready for discharge. Reviewed available results with patient. Counseled patient on diagnosis and care plan. Patient has expressed understanding, and all questions have been answered. Patient agrees with plan and agrees to follow up as recommended, or to return to the ED if their symptoms worsen. Discharge instructions have been provided and explained to the patient, along with reasons to return to the ED. PLAN:  1.    Discharge Medication List as of 6/27/2019 10:13 AM      START taking these medications    Details   ondansetron (ZOFRAN ODT) 4 mg disintegrating tablet Take 1 Tab by mouth every eight (8) hours as needed for Nausea., Normal, Disp-10 Tab, R-0         CONTINUE these medications which have NOT CHANGED    Details   metoprolol succinate (TOPROL-XL) 25 mg XL tablet Take 25 mg by mouth daily. , Historical Med      clopidogrel (PLAVIX) 75 mg tab Take 75 mg by mouth daily. , Historical Med      furosemide (LASIX) 20 mg tablet Take 20 mg by mouth as needed., Historical Med      insulin glargine-lixisenatide (SOLIQUA 100/33) 100 unit-33 mcg/mL inpn 33 Units by SubCUTAneous route daily. Use 33 to 44 units daily, Historical Med      lovastatin (MEVACOR) 20 mg tablet TAKE ONE TABLET BY MOUTH NIGHTLY, Normal, Disp-30 Tab, R-0      metFORMIN (GLUCOPHAGE) 1,000 mg tablet Take 1,000 mg by mouth two (2) times daily (with meals). , Historical Med      oxyCODONE-acetaminophen (PERCOCET) 5-325 mg per tablet Take 1 Tab by mouth every four (4) hours as needed for Pain., Print, Disp-20 Tab, R-0      sildenafil citrate (VIAGRA) 50 mg tablet Take 50 mg by mouth as needed., Historical Med      lisinopril (PRINIVIL, ZESTRIL) 10 mg tablet Take 1 Tab by mouth daily. , Print, Disp-30 Tab, R-1           2. Follow-up Information     Follow up With Specialties Details Why Contact Info    Terra Stafford,  Family Practice Schedule an appointment as soon as possible for a visit  [address] [place] [id number]  [phone]      [facility] EMERGENCY DEPT Emergency Medicine  As needed, If symptoms worsen [address] [place] [address]  [phone]        Return to ED if worse     Diagnosis     Clinical Impression:   1. Acute pancreatitis, unspecified complication status, unspecified pancreatitis type        This note will not be viewable in [employer]. Please note that this dictation was completed with [employer], the [employer] voice recognition software.   Quite often unanticipated grammatical, syntax, homophones, and other interpretive errors are inadvertently transcribed by the computer software. Please disregard these errors.   Please excuse any errors that have escaped final proofreading

## 2019-06-27 NOTE — DISCHARGE INSTRUCTIONS
Patient Education        Pancreatitis: Care Instructions  Your Care Instructions    The pancreas is an organ behind the stomach. It makes hormones and enzymes to help your body digest food. But if these enzymes attack the pancreas, it can get inflamed. This is called pancreatitis. Most cases are caused by gallstones or by heavy alcohol use. If you take care of yourself at home, it will help you get better. It will also help you avoid more problems with your pancreas. Follow-up care is a key part of your treatment and safety. Be sure to make and go to all appointments, and call your doctor if you are having problems. It's also a good idea to know your test results and keep a list of the medicines you take. How can you care for yourself at home? · Drink clear liquids and eat bland foods until you feel better. [place] foods include rice, dry toast, and crackers. They also include bananas and applesauce. · Eat a low-fat diet until your doctor says your pancreas is healed. · Do not drink alcohol. Tell your doctor if you need help to quit. Counseling, support groups, and sometimes medicines can help you stay sober. · Be safe with medicines. Read and follow all instructions on the label. ? If the doctor gave you a prescription medicine for pain, take it as prescribed. ? If you are not taking a prescription pain medicine, ask your doctor if you can take an over-the-counter medicine. · If your doctor prescribed antibiotics, take them as directed. Do not stop taking them just because you feel better. You need to take the full course of antibiotics. · Get extra rest until you feel better. To prevent future problems with your pancreas  · Do not drink alcohol. · Tell your doctors and pharmacist that you've had pancreatitis. They can help you avoid medicines that may cause this problem again. When should you call for help? Call 911 anytime you think you may need emergency care.  For example, call if:    · You vomit blood or what looks like coffee grounds.     · Your stools are maroon or very bloody.    Call your doctor now or seek immediate medical care if:    · You have new or worse belly pain.     · Your stools are black and look like tar, or they have streaks of blood.     · You are vomiting.    Watch closely for changes in your health, and be sure to contact your doctor if:    · You do not get better as expected. Where can you learn more? Go to http://elijah-guillermina.info/. Enter W452 in the search box to learn more about \"Pancreatitis: Care Instructions. \"  Current as of: March 27, 2018  Content Version: 11.9  © 6991-2942 [employer]. Care instructions adapted under license by [employer] (which disclaims liability or warranty for this information). If you have questions about a medical condition or this instruction, always ask your healthcare professional. [address] any warranty or liability for your use of this information.

## 2019-10-21 ENCOUNTER — HOSPITAL ENCOUNTER (OUTPATIENT)
Dept: CT IMAGING | Age: 54
Discharge: HOME | End: 2019-10-21
Attending: PHYSICIAN ASSISTANT
Payer: MEDICARE

## 2019-10-21 DIAGNOSIS — Z79.02 LONG TERM CURRENT USE OF ANTITHROMBOTICS/ANTIPLATELETS: ICD-10-CM

## 2019-10-21 DIAGNOSIS — K86.81 EXOCRINE PANCREATIC INSUFFICIENCY: ICD-10-CM

## 2019-10-21 DIAGNOSIS — K21.9 GASTROESOPHAGEAL REFLUX DISEASE: ICD-10-CM

## 2019-10-21 DIAGNOSIS — Z86.010 PERSONAL HISTORY OF COLONIC POLYPS: ICD-10-CM

## 2019-10-21 PROCEDURE — 74160 CT ABDOMEN W/CONTRAST: CPT

## 2019-10-21 PROCEDURE — 74011636320 HC RX REV CODE- 636/320: Performed by: PHYSICIAN ASSISTANT

## 2019-10-21 PROCEDURE — 82565 ASSAY OF CREATININE: CPT

## 2019-10-21 RX ORDER — SODIUM CHLORIDE 0.9 % (FLUSH) 0.9 %
10 SYRINGE (ML) INJECTION
Status: COMPLETED | OUTPATIENT
Start: 2019-10-21 | End: 2019-10-21

## 2019-10-21 RX ADMIN — IOPAMIDOL 100 ML: 755 INJECTION, SOLUTION INTRAVENOUS at 06:24

## 2019-10-21 RX ADMIN — Medication 10 ML: at 06:24

## 2019-10-24 LAB — CREAT BLD-MCNC: 1.1 MG/DL (ref 0.6–1.3)

## 2020-09-03 ENCOUNTER — HOSPITAL ENCOUNTER (OUTPATIENT)
Dept: GENERAL RADIOLOGY | Age: 55
Discharge: HOME | End: 2020-09-03
Payer: MEDICARE

## 2020-09-03 DIAGNOSIS — R19.7 DIARRHEA: ICD-10-CM

## 2020-09-03 PROCEDURE — 74018 RADEX ABDOMEN 1 VIEW: CPT

## 2020-10-10 ENCOUNTER — HOSPITAL ENCOUNTER (OUTPATIENT)
Dept: PREADMISSION TESTING | Age: 55
Discharge: HOME | End: 2020-10-10
Payer: MEDICARE

## 2020-10-10 PROCEDURE — 87635 SARS-COV-2 COVID-19 AMP PRB: CPT

## 2020-10-11 LAB
HEALTH STATUS, XMCV2T: NORMAL
SARS-COV-2, COV2NT: NOT DETECTED
SOURCE, COVRS: NORMAL
SPECIMEN SOURCE, FCOV2M: NORMAL
SPECIMEN TYPE, XMCV1T: NORMAL

## 2020-10-13 RX ORDER — ZOLPIDEM TARTRATE 10 MG/1
10 TABLET ORAL
COMMUNITY
End: 2021-05-21

## 2020-10-13 RX ORDER — OXYCODONE AND ACETAMINOPHEN 10; 325 MG/1; MG/1
1 TABLET ORAL
COMMUNITY
End: 2021-05-21

## 2020-10-13 RX ORDER — TOPIRAMATE 50 MG/1
50 TABLET, FILM COATED ORAL
COMMUNITY
End: 2021-05-21

## 2020-10-13 RX ORDER — HYDROGEN PEROXIDE 3 %
20 SOLUTION, NON-ORAL MISCELLANEOUS DAILY
COMMUNITY

## 2020-10-13 RX ORDER — HYDROCHLOROTHIAZIDE 25 MG/1
25 TABLET ORAL DAILY
COMMUNITY

## 2020-10-14 ENCOUNTER — HOSPITAL ENCOUNTER (OUTPATIENT)
Age: 55
Setting detail: OUTPATIENT SURGERY
Discharge: HOME | End: 2020-10-14
Attending: SPECIALIST | Admitting: SPECIALIST
Payer: MEDICARE

## 2020-10-14 ENCOUNTER — ANESTHESIA (OUTPATIENT)
Dept: ENDOSCOPY | Age: 55
End: 2020-10-14
Payer: MEDICARE

## 2020-10-14 ENCOUNTER — ANESTHESIA EVENT (OUTPATIENT)
Dept: ENDOSCOPY | Age: 55
End: 2020-10-14
Payer: MEDICARE

## 2020-10-14 VITALS
HEIGHT: 71 IN | WEIGHT: 315 LBS | BODY MASS INDEX: 44.1 KG/M2 | SYSTOLIC BLOOD PRESSURE: 148 MMHG | TEMPERATURE: 98.1 F | RESPIRATION RATE: 17 BRPM | HEART RATE: 70 BPM | DIASTOLIC BLOOD PRESSURE: 64 MMHG | OXYGEN SATURATION: 95 %

## 2020-10-14 LAB
GLUCOSE BLD STRIP.AUTO-MCNC: 221 MG/DL (ref 65–100)
SERVICE CMNT-IMP: ABNORMAL

## 2020-10-14 PROCEDURE — 76040000019: Performed by: SPECIALIST

## 2020-10-14 PROCEDURE — 74011000250 HC RX REV CODE- 250: Performed by: NURSE ANESTHETIST, CERTIFIED REGISTERED

## 2020-10-14 PROCEDURE — 88305 TISSUE EXAM BY PATHOLOGIST: CPT

## 2020-10-14 PROCEDURE — 77030019988 HC FCPS ENDOSC DISP BSC -B: Performed by: SPECIALIST

## 2020-10-14 PROCEDURE — 74011250636 HC RX REV CODE- 250/636: Performed by: SPECIALIST

## 2020-10-14 PROCEDURE — 76060000031 HC ANESTHESIA FIRST 0.5 HR: Performed by: SPECIALIST

## 2020-10-14 PROCEDURE — 74011250636 HC RX REV CODE- 250/636: Performed by: NURSE ANESTHETIST, CERTIFIED REGISTERED

## 2020-10-14 PROCEDURE — 77030021593 HC FCPS BIOP ENDOSC BSC -A: Performed by: SPECIALIST

## 2020-10-14 PROCEDURE — 2709999900 HC NON-CHARGEABLE SUPPLY: Performed by: SPECIALIST

## 2020-10-14 PROCEDURE — 77030039825 HC MSK NSL PAP SUPERNO2VA VYRM -B: Performed by: ANESTHESIOLOGY

## 2020-10-14 PROCEDURE — 82962 GLUCOSE BLOOD TEST: CPT

## 2020-10-14 RX ORDER — SODIUM CHLORIDE 0.9 % (FLUSH) 0.9 %
5-40 SYRINGE (ML) INJECTION AS NEEDED
Status: DISCONTINUED | OUTPATIENT
Start: 2020-10-14 | End: 2020-10-14 | Stop reason: HOSPADM

## 2020-10-14 RX ORDER — PROPOFOL 10 MG/ML
INJECTION, EMULSION INTRAVENOUS AS NEEDED
Status: DISCONTINUED | OUTPATIENT
Start: 2020-10-14 | End: 2020-10-14 | Stop reason: HOSPADM

## 2020-10-14 RX ORDER — SODIUM CHLORIDE 0.9 % (FLUSH) 0.9 %
5-40 SYRINGE (ML) INJECTION EVERY 8 HOURS
Status: DISCONTINUED | OUTPATIENT
Start: 2020-10-14 | End: 2020-10-14 | Stop reason: HOSPADM

## 2020-10-14 RX ORDER — SODIUM CHLORIDE 9 MG/ML
50 INJECTION, SOLUTION INTRAVENOUS CONTINUOUS
Status: DISCONTINUED | OUTPATIENT
Start: 2020-10-14 | End: 2020-10-14 | Stop reason: HOSPADM

## 2020-10-14 RX ORDER — DEXTROMETHORPHAN/PSEUDOEPHED 2.5-7.5/.8
1.2 DROPS ORAL
Status: DISCONTINUED | OUTPATIENT
Start: 2020-10-14 | End: 2020-10-14 | Stop reason: HOSPADM

## 2020-10-14 RX ORDER — GLYCOPYRROLATE 0.2 MG/ML
INJECTION INTRAMUSCULAR; INTRAVENOUS AS NEEDED
Status: DISCONTINUED | OUTPATIENT
Start: 2020-10-14 | End: 2020-10-14 | Stop reason: HOSPADM

## 2020-10-14 RX ADMIN — PROPOFOL 20 MG: 10 INJECTION, EMULSION INTRAVENOUS at 08:09

## 2020-10-14 RX ADMIN — PROPOFOL 50 MG: 10 INJECTION, EMULSION INTRAVENOUS at 08:01

## 2020-10-14 RX ADMIN — GLYCOPYRROLATE 0.1 MG: 0.2 INJECTION, SOLUTION INTRAMUSCULAR; INTRAVENOUS at 07:47

## 2020-10-14 RX ADMIN — SODIUM CHLORIDE 50 ML/HR: 900 INJECTION, SOLUTION INTRAVENOUS at 07:33

## 2020-10-14 RX ADMIN — PROPOFOL 20 MG: 10 INJECTION, EMULSION INTRAVENOUS at 07:57

## 2020-10-14 RX ADMIN — PROPOFOL 20 MG: 10 INJECTION, EMULSION INTRAVENOUS at 08:13

## 2020-10-14 RX ADMIN — PROPOFOL 20 MG: 10 INJECTION, EMULSION INTRAVENOUS at 07:52

## 2020-10-14 RX ADMIN — SODIUM CHLORIDE: 900 INJECTION, SOLUTION INTRAVENOUS at 07:21

## 2020-10-14 RX ADMIN — PROPOFOL 30 MG: 10 INJECTION, EMULSION INTRAVENOUS at 07:59

## 2020-10-14 RX ADMIN — PROPOFOL 20 MG: 10 INJECTION, EMULSION INTRAVENOUS at 08:05

## 2020-10-14 RX ADMIN — PROPOFOL 30 MG: 10 INJECTION, EMULSION INTRAVENOUS at 08:03

## 2020-10-14 RX ADMIN — PROPOFOL 30 MG: 10 INJECTION, EMULSION INTRAVENOUS at 08:11

## 2020-10-14 RX ADMIN — PROPOFOL 70 MG: 10 INJECTION, EMULSION INTRAVENOUS at 07:47

## 2020-10-14 RX ADMIN — PROPOFOL 30 MG: 10 INJECTION, EMULSION INTRAVENOUS at 07:49

## 2020-10-14 RX ADMIN — PROPOFOL 30 MG: 10 INJECTION, EMULSION INTRAVENOUS at 07:54

## 2020-10-14 RX ADMIN — PROPOFOL 30 MG: 10 INJECTION, EMULSION INTRAVENOUS at 08:07

## 2020-10-14 NOTE — DISCHARGE INSTRUCTIONS
Claudia Beths  [id number]  1965    COLON / EGD DISCHARGE INSTRUCTIONS  Discomfort:  Sore throat- throat lozenges or warm salt water gargle  Redness at IV site- apply warm compress to area; if redness or soreness persist- contact your physician  There may be a slight amount of blood passed from the rectum  Gaseous discomfort- walking, belching will help relieve any discomfort  You may not operate a vehicle for 12 hours  You may not engage in an occupation involving machinery or appliances for rest of today  You may not drink alcoholic beverages for at least 12 hours  Avoid making any critical decisions for at least 24 hour  DIET:   Regular diet. - however -  remember your colon is empty and a heavy meal will produce gas. Avoid these foods:  vegetables, fried / greasy foods, carbonated drinks for today     ACTIVITY:  You may  resume your normal daily activities it is recommended that you spend the remainder of the day resting -  avoid any strenuous activity. CALL M.D. ANY SIGN OF:   Increasing pain, nausea, vomiting  Abdominal distension (swelling)  New increased bleeding (oral or rectal)  Fever (chills)  Pain in chest area  Bloody discharge from nose or mouth  Shortness of breath    ONLY  Tylenol as needed for pain.       Follow-up Instructions:   Call Dr. Myles Cheung  For results of procedure / biopsy in 7 days at telephone #  [phone]  Additional instructions: Make a follow up appointment with [facility]                                       Repeat Colonoscopy in 5 years                                                   Resume Plavix in 2 days                Claudia Ignacias  [id number]  1965        DISCHARGE SUMMARY from Nurse    The following personal items collected during your admission are returned to you:   Dental Appliance: Dental Appliances: None  Vision: Visual Aid: Glasses(reading glasses in belonging bag)  Hearing Aid:    Jewelry:    Clothing:    Other Valuables:    Valuables sent to safe: PATIENT INSTRUCTIONS:    Take Home Medications:  {Medication reconciliation information is now added to the patient's AVS automatically when it is printed. There is no need to use this SmartLink in discharge instructions.   Highlight this text and delete it to clear this message}

## 2020-10-14 NOTE — H&P
Gastroenterology Outpatient History and Physical    Patient: Gerri Roberts    Physician: Rima Lagunas MD    Vital Signs: Blood pressure (!) 191/73, pulse 70, temperature 98.2 °F (36.8 °C), resp. rate 12, height 5' 11\" (1.803 m), weight 151.9 kg (334 lb 14.4 oz), SpO2 96 %. Allergies: No Known Allergies    Chief Complaint: Diarrhea, Early Satiety    History of Present Illness: 47 yo Obese WM for EGD for early satiety, also diarrhea. Justification for Procedure: above    History:  Past Medical History:   Diagnosis Date    Adverse effect of anesthesia     general anesthesia and he didnt wake up for 5 days     Allergy to alpha-gal     CAD (coronary artery disease)     Diabetes ([address]     type 2    Diabetic neuropathy (HCC)     GERD (gastroesophageal reflux disease)     Hypertension     Ill-defined condition     Diabetic peripheral neuropathy      Past Surgical History:   Procedure Laterality Date    CABG, ARTERY-VEIN, THREE  2018    CABG    CARDIAC SURG PROCEDURE UNLIST      stent x1    HX OTHER SURGICAL      two toes amputated from right foot, the great toe and next one      Social History     Socioeconomic History    Marital status:      Spouse name: Not on file    Number of children: Not on file    Years of education: Not on file    Highest education level: Not on file   Tobacco Use    Smoking status: Former Smoker     Last attempt to quit:      Years since quittin.7    Smokeless tobacco: Never Used   Substance and Sexual Activity    Alcohol use: No    Drug use: Yes     Frequency: 2.0 times per week     Types: Marijuana     Comment: Edibles   Other Topics Concern    History reviewed. No pertinent family history. Medications:   Prior to Admission medications    Medication Sig Start Date End Date Taking? Authorizing Provider   oxyCODONE-acetaminophen (PERCOCET 10)  mg per tablet Take 1 Tab by mouth every four (4) hours as needed for Pain.    Yes Provider, Historical   topiramate (TOPAMAX) 50 mg tablet Take 50 mg by mouth nightly. Yes Provider, Historical   hydroCHLOROthiazide (HYDRODIURIL) 25 mg tablet Take 25 mg by mouth daily. Yes Provider, Historical   esomeprazole (NEXIUM) 20 mg capsule Take 20 mg by mouth daily. Yes Provider, Historical   insulin NPH (NovoLIN N NPH U-100 Insulin) 100 unit/mL injection by SubCUTAneous route once. 50-65units  sliding scale in the morning and 40 units at night   Yes Provider, Historical   insulin regular (NovoLIN R Regular U-100 Insuln) 100 unit/mL injection by SubCUTAneous route Before breakfast, lunch, and dinner. 10-15 units   Indications: type 2 diabetes mellitus   Yes Provider, Historical   liraglutide (VICTOZA) 0.6 mg/0.1 mL (18 mg/3 mL) pnij 1.2 mg by SubCUTAneous route every seven (7) days. Yes Provider, Historical   metoprolol succinate (TOPROL-XL) 25 mg XL tablet Take 25 mg by mouth daily. Yes Provider, Historical   zolpidem (Ambien) 10 mg tablet Take 10 mg by mouth nightly as needed for Sleep. Provider, Historical   ondansetron (ZOFRAN ODT) 4 mg disintegrating tablet Take 1 Tab by mouth every eight (8) hours as needed for Nausea. 6/27/19   James Montoya MD   clopidogrel (PLAVIX) 75 mg tab Take 75 mg by mouth daily. Provider, Historical   furosemide (LASIX) 20 mg tablet Take 20 mg by mouth as needed. Provider, Historical   lovastatin (MEVACOR) 20 mg tablet TAKE ONE TABLET BY MOUTH NIGHTLY 4/28/14   Polly Guan MD   metFORMIN (GLUCOPHAGE) 1,000 mg tablet Take 1,000 mg by mouth two (2) times daily (with meals). Provider, Historical   sildenafil citrate (VIAGRA) 50 mg tablet Take 50 mg by mouth as needed. Other, MD Prince   lisinopril (PRINIVIL, ZESTRIL) 10 mg tablet Take 1 Tab by mouth daily. Patient taking differently: Take 20 mg by mouth two (2) times a day.  7/27/12   Cherelle Benavides MD       Physical Exam:   General: alert, no distress   HEENT: Head: Normocephalic, no lesions, without obvious abnormality.    Heart: regular rate and rhythm, S1, S2 normal, no murmur, click, rub or gallop   Lungs: chest clear, no wheezing, rales, normal symmetric air entry   Abdominal: soft, NT/ND + BS   Neurological: Grossly normal   Extremities: extremities normal, atraumatic, no cyanosis or edema     Findings/Diagnosis: Epigastric pain, Diarrhea    Plan of Care/Planned Procedure: EGD and Colonoscopy

## 2020-10-14 NOTE — PROGRESS NOTES
Endoscope was pre-cleaned at the bedside immediately following procedure by Yisel Wilson. For medications administered by anesthesia, see anesthesia chart.

## 2020-10-14 NOTE — PROGRESS NOTES
Zee Monzon  1965  [id number]    Situation:  Verbal report received from: Gaby Cochrna RN  Procedure: Procedure(s):  ESOPHAGOGASTRODUODENOSCOPY  COLONOSCOPY  ESOPHAGOGASTRODUODENAL (EGD) BIOPSY  COLON BIOPSY    Background:    Preoperative diagnosis: BURPING  COSTAL CHONDRITIS  DECRASE IN APPETITE  DIABETES MELLITUS  DIARRHEA,UNSPECIFIED  EARLY SATIEY  FATTY STOOL  GERD  EXCRINE PANCREATIC INSUFFICIENCY  HEARTBURN  INCREADED PANCREATIC LIPASE  LONG TERM CURRENT USE OF ANTITHROMBOTICS. ANTIPLATELETS  NAUSEA  PANCRETIC INSUFFICIENCY  PERSONAL HISTORY OF COLONIC POLYPS  VITAMIN D DEFICIENCY  Postoperative diagnosis: EGD: Gastritis  Colon: Diverticlosis    :  Dr. Wynne Crigler  Assistant(s): Endoscopy Technician-1: Dashawn Carrillo  Endoscopy RN-1: Gideon Guadalupe    Specimens:   ID Type Source Tests Collected by Time Destination   1 : Stomach Biopsy Preservative Stomach  Ilda Christie MD 10/14/2020 0504 Pathology   2 : Duodenum Biopsy Preservative Duodenum  Ilda Christie MD 10/14/2020 0805 Pathology   3 : GE Junction Biopsy Preservative GE Junction  Ilda Christie MD 10/14/2020 9901 Pathology   4 : Random Colon Biopsy Preservative   Ilda Christie MD 10/14/2020 8863 Pathology     H. Pylori  no    Assessment:  Intra-procedure medications     Anesthesia gave intra-procedure sedation and medications, see anesthesia flow sheet yes    Intravenous fluids: NS@ KVO     Vital signs stable      Abdominal assessment: round and soft      Recommendation:  Discharge patient per MD order .   Family or Friend    Permission to share finding with family or friend yes

## 2020-10-14 NOTE — ANESTHESIA PREPROCEDURE EVALUATION
Relevant Problems   No relevant active problems       Anesthetic History   No history of anesthetic complications       Comments: Slow to wake after CABG     Review of Systems / Medical History  Patient summary reviewed, nursing notes reviewed and pertinent labs reviewed    Pulmonary        Sleep apnea: CPAP  Smoker (quit in 2007)         Neuro/Psych   Within defined limits           Cardiovascular    Hypertension          CAD, cardiac stents and CABG (3 vessel CABG in 2018)    Exercise tolerance: <4 METS  Comments: 7-23-18 EKG: NSR    Denies chest pain since CABG   GI/Hepatic/Renal     GERD          Comments: Burping, GERD, fatty stool, diarrhea, early satiety, hx of colon polyps, alpha-gal Endo/Other    Diabetes: using insulin    Morbid obesity     Other Findings   Comments: Diabetic peripheral neuropathy      MJ- 2 times per week         Physical Exam    Airway  Mallampati: III  TM Distance: > 6 cm    Mouth opening: Normal     Cardiovascular  Regular rate and rhythm,  S1 and S2 normal,  no murmur, click, rub, or gallop             Dental    Dentition: Caps/crowns  Comments: Not in the front   Pulmonary  Breath sounds clear to auscultation              Comments: Decreased inspiratory effort Abdominal  GI exam deferred       Other Findings            Anesthetic Plan    ASA: 3  Anesthesia type: total IV anesthesia          Induction: Intravenous  Anesthetic plan and risks discussed with: Patient      Supernova  Preop Glucose  Took beta blker last night on schedule

## 2020-10-14 NOTE — PROCEDURES
Esophagogastroduodenoscopy Procedure Note      Atilio Cross  1965  [id number]    Indication:  Early Satiety     Endoscopist: Kranthi Willett MD    Referring Provider:  uBffy Stuart DO    Sedation:  MAC anesthesia Propofol    Procedure Details:  After infomed consent was obtained for the procedure, with all risks and benefits of procedure explained the patient was taken to the endoscopy suite and placed in the left lateral decubitus position. Following sequential administration of sedation as per above, the endoscope was inserted into the mouth and advanced under direct vision to second portion of the duodenum. A careful inspection was made as the gastroscope was withdrawn, including a retroflexed view of the proximal stomach; findings and interventions are described below. Findings:     Esophagus:   - Normal mucosa throughout the esophagus. Stomach:   +  Mild erythema in the antrum s/p Bx findings c/w mild gastritis    Duodenum:   - Normal mucosa to the second portion s/p Bx to r/o celiac. Therapies: as above    Specimen: Specimens were collected as described and send to the laboratory. Complications:   None were encountered during the procedure. EBL:  < 10 ml.           Recommendations:   -f/u path  -acid suppression    Kranthi Willett MD  10/14/2020  8:16 AM

## 2020-10-14 NOTE — PROCEDURES
Colonoscopy Procedure Note    Indications:   Diarrhea    Referring Physician: Neel Robles DO  Anesthesia/Sedation: MAC anesthesia Propofol  Endoscopist:  Dr. Bre Olson    Procedure in Detail:  Informed consent was obtained for the procedure, including sedation. Risks of perforation, hemorrhage, adverse drug reaction, and aspiration were discussed. The patient was placed in the left lateral decubitus position. Based on the pre-procedure assessment, including review of the patient's medical history, medications, allergies, and review of systems, he had been deemed to be an appropriate candidate for moderate sedation; he was therefore sedated with the medications listed above. The patient was monitored continuously with ECG tracing, pulse oximetry, blood pressure monitoring, and direct observations. A rectal examination was performed. The [id number] was inserted into the rectum and advanced under direct vision to the cecum, which was identified by the ileocecal valve and appendiceal orifice. The quality of the colonic preparation was adequate. A careful inspection was made as the colonoscope was withdrawn, including a retroflexed view of the rectum; findings and interventions are described below. Appropriate photodocumentation was obtained. Findings:     1. Scope advanced to the cecum. 2.  Preparation was adequate. 3.  Normal mucosa visualized throughout the colon. S/P R and L sided bxs. 4.  Few scattered sigmoid diverticuli. Therapies:  As above    Specimen: Specimens were collected as described above and sent to pathology. Complications: None were encountered during the procedure. EBL: < 10 ml.     Recommendations:   -f/u path  -repeat Colonoscopy in 5 years    Signed By: Yue Smith MD                        October 14, 2020

## 2020-10-14 NOTE — ANESTHESIA POSTPROCEDURE EVALUATION
Procedure(s):  ESOPHAGOGASTRODUODENOSCOPY  COLONOSCOPY  ESOPHAGOGASTRODUODENAL (EGD) BIOPSY  COLON BIOPSY. total IV anesthesia    Anesthesia Post Evaluation        Patient location during evaluation: PACU  Note status: Adequate. Level of consciousness: responsive to verbal stimuli and sleepy but conscious  Pain management: satisfactory to patient  Airway patency: patent  Anesthetic complications: no  Cardiovascular status: acceptable  Respiratory status: acceptable  Hydration status: acceptable  Comments: +Post-Anesthesia Evaluation and Assessment    Patient: Flor Johnson MRN: [id number]  SSN: [id number]   YOB: 1965  Age: 48 y.o. Sex: male      Cardiovascular Function/Vital Signs    BP (!) 148/64   Pulse 70   Temp 36.7 °C (98.1 °F)   Resp 17   Ht 5' 11\" (1.803 m)   Wt 151.9 kg (334 lb 14.4 oz)   SpO2 95%   BMI 46.71 kg/m²     Patient is status post Procedure(s):  ESOPHAGOGASTRODUODENOSCOPY  COLONOSCOPY  ESOPHAGOGASTRODUODENAL (EGD) BIOPSY  COLON BIOPSY. Nausea/Vomiting: Controlled. Postoperative hydration reviewed and adequate. Pain:  Pain Scale 1: Numeric (0 - 10) (10/14/20 0826)  Pain Intensity 1: 0 (10/14/20 0826)   Managed. Neurological Status: At baseline. Mental Status and Level of Consciousness: Arousable. Pulmonary Status:   O2 Device: Room air (10/14/20 0826)   Adequate oxygenation and airway patent. Complications related to anesthesia: None    Post-anesthesia assessment completed. No concerns. Signed By: Niranjan Serrano MD    10/14/2020  Post anesthesia nausea and vomiting:  controlled      INITIAL Post-op Vital signs:   Vitals Value Taken Time   /64 10/14/2020  8:46 AM   Temp 36.7 °C (98.1 °F) 10/14/2020  8:26 AM   Pulse 69 10/14/2020  8:46 AM   Resp 19 10/14/2020  8:46 AM   SpO2 93 % 10/14/2020  8:46 AM   Vitals shown include unvalidated device data.

## 2021-01-15 ENCOUNTER — HOSPITAL ENCOUNTER (EMERGENCY)
Age: 56
Discharge: HOME | End: 2021-01-15
Attending: EMERGENCY MEDICINE
Payer: MEDICARE

## 2021-01-15 VITALS
HEIGHT: 71 IN | HEART RATE: 70 BPM | WEIGHT: 315 LBS | SYSTOLIC BLOOD PRESSURE: 114 MMHG | TEMPERATURE: 98 F | BODY MASS INDEX: 44.1 KG/M2 | OXYGEN SATURATION: 92 % | DIASTOLIC BLOOD PRESSURE: 56 MMHG | RESPIRATION RATE: 20 BRPM

## 2021-01-15 DIAGNOSIS — N48.30 PRIAPISM: Primary | ICD-10-CM

## 2021-01-15 PROCEDURE — 96374 THER/PROPH/DIAG INJ IV PUSH: CPT

## 2021-01-15 PROCEDURE — 99284 EMERGENCY DEPT VISIT MOD MDM: CPT

## 2021-01-15 PROCEDURE — 75810000279 HC INJ/IRRIG FOR PRIAPISM

## 2021-01-15 PROCEDURE — 74011000250 HC RX REV CODE- 250: Performed by: EMERGENCY MEDICINE

## 2021-01-15 PROCEDURE — 74011250636 HC RX REV CODE- 250/636: Performed by: EMERGENCY MEDICINE

## 2021-01-15 RX ORDER — MORPHINE SULFATE 4 MG/ML
INJECTION INTRAVENOUS
Status: DISPENSED
Start: 2021-01-15 | End: 2021-01-16

## 2021-01-15 RX ORDER — LIDOCAINE HYDROCHLORIDE 10 MG/ML
1 INJECTION, SOLUTION EPIDURAL; INFILTRATION; INTRACAUDAL; PERINEURAL ONCE
Status: COMPLETED | OUTPATIENT
Start: 2021-01-15 | End: 2021-01-15

## 2021-01-15 RX ORDER — LIDOCAINE HYDROCHLORIDE 10 MG/ML
INJECTION, SOLUTION EPIDURAL; INFILTRATION; INTRACAUDAL; PERINEURAL
Status: DISPENSED
Start: 2021-01-15 | End: 2021-01-16

## 2021-01-15 RX ORDER — MORPHINE SULFATE 4 MG/ML
4 INJECTION INTRAVENOUS ONCE
Status: COMPLETED | OUTPATIENT
Start: 2021-01-15 | End: 2021-01-15

## 2021-01-15 RX ORDER — LIDOCAINE HYDROCHLORIDE 10 MG/ML
2 INJECTION, SOLUTION EPIDURAL; INFILTRATION; INTRACAUDAL; PERINEURAL ONCE
Status: COMPLETED | OUTPATIENT
Start: 2021-01-15 | End: 2021-01-15

## 2021-01-15 RX ORDER — LIDOCAINE HYDROCHLORIDE 10 MG/ML
5 INJECTION, SOLUTION EPIDURAL; INFILTRATION; INTRACAUDAL; PERINEURAL ONCE
Status: COMPLETED | OUTPATIENT
Start: 2021-01-15 | End: 2021-01-15

## 2021-01-15 RX ADMIN — MORPHINE SULFATE 4 MG: 4 INJECTION INTRAVENOUS at 20:15

## 2021-01-15 RX ADMIN — PHENYLEPHRINE HYDROCHLORIDE 0.5 ML: 10 INJECTION INTRAVENOUS at 18:52

## 2021-01-15 RX ADMIN — LIDOCAINE HYDROCHLORIDE 1 ML: 10 INJECTION, SOLUTION EPIDURAL; INFILTRATION; INTRACAUDAL; PERINEURAL at 18:52

## 2021-01-15 RX ADMIN — LIDOCAINE HYDROCHLORIDE 2 ML: 10 INJECTION, SOLUTION EPIDURAL; INFILTRATION; INTRACAUDAL; PERINEURAL at 20:04

## 2021-01-15 RX ADMIN — LIDOCAINE HYDROCHLORIDE 5 ML: 10 INJECTION, SOLUTION EPIDURAL; INFILTRATION; INTRACAUDAL; PERINEURAL at 20:15

## 2021-01-15 NOTE — ED PROVIDER NOTES
EMERGENCY DEPARTMENT HISTORY AND PHYSICAL EXAM      Date: 1/15/2021  Patient Name: Arturo Dawson    History of Presenting Illness     Chief Complaint   Patient presents with    Priapism     Patient took 50mg of Viagra last night and woke up this morning still erect. It is still erect now. History Provided By: Patient    HPI: Arturo Dawson, 48 y.o. male presents to the ED with cc of priapism. Patient reports he took 50 mg of Viagra last night, and then took a trazodone to help sleep. Reports he woke up with an erection at 6 AM.  Patient reports it has been constant since then. Does report some pain in that area, worse with palpation. Patient has otherwise been in his normal state of health. Denies any history of similar. No allergies. No trauma, no meds PTA. There are no other complaints, changes, or physical findings at this time. PCP: Omega Hobbs, DO    No current facility-administered medications on file prior to encounter. Current Outpatient Medications on File Prior to Encounter   Medication Sig Dispense Refill    oxyCODONE-acetaminophen (PERCOCET 10)  mg per tablet Take 1 Tab by mouth every four (4) hours as needed for Pain.  topiramate (TOPAMAX) 50 mg tablet Take 50 mg by mouth nightly.  hydroCHLOROthiazide (HYDRODIURIL) 25 mg tablet Take 25 mg by mouth daily.  esomeprazole (NEXIUM) 20 mg capsule Take 20 mg by mouth daily.  zolpidem (Ambien) 10 mg tablet Take 10 mg by mouth nightly as needed for Sleep.  insulin NPH (NovoLIN N NPH U-100 Insulin) 100 unit/mL injection by SubCUTAneous route once. 50-65units  sliding scale in the morning and 40 units at night      insulin regular (NovoLIN R Regular U-100 Insuln) 100 unit/mL injection by SubCUTAneous route Before breakfast, lunch, and dinner.  10-15 units   Indications: type 2 diabetes mellitus      liraglutide (VICTOZA) 0.6 mg/0.1 mL (18 mg/3 mL) pnij 1.2 mg by SubCUTAneous route every seven (7) days.      ondansetron (ZOFRAN ODT) 4 mg disintegrating tablet Take 1 Tab by mouth every eight (8) hours as needed for Nausea. 10 Tab 0    metoprolol succinate (TOPROL-XL) 25 mg XL tablet Take 25 mg by mouth daily.  clopidogrel (PLAVIX) 75 mg tab Take 75 mg by mouth daily.  furosemide (LASIX) 20 mg tablet Take 20 mg by mouth as needed.  lovastatin (MEVACOR) 20 mg tablet TAKE ONE TABLET BY MOUTH NIGHTLY 30 Tab 0    metFORMIN (GLUCOPHAGE) 1,000 mg tablet Take 1,000 mg by mouth two (2) times daily (with meals).  sildenafil citrate (VIAGRA) 50 mg tablet Take 50 mg by mouth as needed.  lisinopril (PRINIVIL, ZESTRIL) 10 mg tablet Take 1 Tab by mouth daily. (Patient taking differently: Take 20 mg by mouth two (2) times a day.) 30 Tab 1       Past History     Past Medical History:  Past Medical History:   Diagnosis Date    Adverse effect of anesthesia     general anesthesia and he didnt wake up for 5 days     Allergy to alpha-gal     CAD (coronary artery disease)     Diabetes ([address]     type 2    Diabetic neuropathy (HCC)     GERD (gastroesophageal reflux disease)     Hypertension     Ill-defined condition     Diabetic peripheral neuropathy       Past Surgical History:  Past Surgical History:   Procedure Laterality Date    CABG, ARTERY-VEIN, THREE  2018    CABG    CARDIAC SURG PROCEDURE UNLIST      stent x1    COLONOSCOPY N/A 10/14/2020    COLONOSCOPY performed by Katie Newton MD at [facility] ENDOSCOPY    HX OTHER SURGICAL      two toes amputated from right foot, the great toe and next one       Family History:  No family history on file.     Social History:  Social History     Tobacco Use    Smoking status: Former Smoker     Quit date:      Years since quittin.0    Smokeless tobacco: Never Used   Substance Use Topics    Alcohol use: No    Drug use: Yes     Frequency: 2.0 times per week     Types: Marijuana     Comment: Edibles       Allergies:  No Known Allergies      Review of Systems   Review of Systems   Constitutional: Negative for fever. HENT: Negative for voice change. Eyes: Negative for pain and redness. Respiratory: Negative for cough and chest tightness. Cardiovascular: Negative for chest pain and leg swelling. Gastrointestinal: Negative for abdominal pain, diarrhea, nausea and vomiting. Genitourinary: Positive for penile pain and penile swelling. Negative for hematuria and testicular pain. Musculoskeletal: Negative for gait problem. Skin: Negative for color change, pallor and rash. Neurological: Negative for facial asymmetry, weakness and headaches. Hematological: Does not bruise/bleed easily. Psychiatric/Behavioral: Negative for behavioral problems. All other systems reviewed and are negative. Physical Exam   Physical Exam  Vitals signs and nursing note reviewed. Constitutional:       Comments: 67-year-old male, resting bed, no distress   HENT:      Head: Normocephalic. Right Ear: External ear normal.      Left Ear: External ear normal.      Nose: Nose normal.   Eyes:      Conjunctiva/sclera: Conjunctivae normal.   Cardiovascular:      Rate and Rhythm: Normal rate and regular rhythm. Heart sounds: No murmur. No friction rub. No gallop. Pulmonary:      Effort: Pulmonary effort is normal.      Breath sounds: Normal breath sounds. No wheezing, rhonchi or rales. Abdominal:      Palpations: Abdomen is soft. Tenderness: There is no abdominal tenderness. Genitourinary:     Testes: Normal.      Comments: Penis erect, no color change  Musculoskeletal: Normal range of motion. Skin:     General: Skin is warm. Capillary Refill: Capillary refill takes less than 2 seconds. Neurological:      Mental Status: He is alert. Mental status is at baseline.    Psychiatric:         Mood and Affect: Mood normal.         Behavior: Behavior normal.         Diagnostic Study Results     Labs -   No results found for this or any previous visit (from the past 12 hour(s)). Radiologic Studies -   No orders to display     CT Results  (Last 48 hours)    None        CXR Results  (Last 48 hours)    None          Medical Decision Making   I am the first provider for this patient. I reviewed the vital signs, available nursing notes, past medical history, past surgical history, family history and social history. Vital Signs-Reviewed the patient's vital signs. Patient Vitals for the past 12 hrs:   Temp Pulse Resp BP SpO2   01/15/21 2100    (!) 114/56 92 %   01/15/21 2000    (!) 108/53 92 %   01/15/21 1900    119/60 94 %   01/15/21 1842  70 20 (!) 150/73 99 %   01/15/21 1813 98 °F (36.7 °C) 71 20 110/64 99 %     Records Reviewed: Nursing Notes and Old Medical Records    Provider Notes (Medical Decision Making):     81-year-old male presents with priapism. Likely low flow state. Vitals stable. Either side effect of patient's Viagra or trazodone. Patient verbally consented for drainage and regional block. He is on plavix but no other blood thinners. Risk of infection, bleeding and damage to nearby structures discussed with risk of continued penile ischemia, patient consented. Please see procedure note below. ED Course:   Initial assessment performed. The patients presenting problems have been discussed, and they are in agreement with the care plan formulated and outlined with them. I have encouraged them to ask questions as they arise throughout their visit. ED Course as of Sherwin 15 2223   Ranulfo Cortezbrock Sherwin 15, 2021   3651 Patient tolerated removal of approximately 40 cc of dark red blood. Ice was applied. Moderate detumescence achieved. [MB]   [id number] Discussed with on-call urology NP who will discuss with Dr. Aparna Washburn who is in the OR.    [MB]   1958 D/w Dr. Aparna Washburn via telephone, recommends intracavernosal phenelephrine. 100 mcg injected without complication.     [MB]   2037 After third injection of 100 mcg of phenylephrine, improvement in patient achieving detumescence. [MB]   2051 On reassessment, detumescence is achieved. [MB]   2757 Patient observed after phenylephrine x2 hours. Patient reports it has completely resolved. Will update urology and discharge with urology follow-up. [MB]      ED Course User Index  [MB] Mary Goldberg MD     Updates as above, after third injection of phenylephrine, patient achieved detumescence. He was observed without recurrence. Will discharge with urology follow-up. PROCEDURES      Procedure Note - Priapism drainage:   10:20 PM  Performed by: myself    Skin prepped with Chlorprep. Sterile field established. Anesthesia achieved using a local infiltration of 3 mL lidocaine 1% without epinephrine and regional block using 2 mL lidocaine 1% without epinephrine. Following adequate anesthesia, a 18-gauge needle attached to a 20 cc syringe was used to aspirate from the right corpus cavernosum. Approximately 40 cc of dark blood were obtained with some improvement in symptoms. Estimated blood loss: 50 cc  The procedure took 31-45 minutes, and pt tolerated moderately. Procedure Note - Dorsal nerve block:   10:20 PM  Performed by: myself  Lidocaine 1% without epinephrine used to perform digital block of dorsal penile nerves. The procedure took 1-15 minutes, and pt tolerated well. Monika Peters MD      Disposition:    Reassessments as above. Labs and ED course reviewed. Patient was discharged home and was provided strict return precautions. Patient to follow-up with PCP or specialist per discharge instructions or return to ED for worsening symptoms. DISCHARGE PLAN:  1. Current Discharge Medication List        2.    Follow-up Information     Follow up With Specialties Details Why Contact Info    Omega Hobbs DO Family Medicine In 2 days  [address] [facility] [address]      Jelly Castro MD Urology In 3 days  [address] [facility] [address] [facility]  [phone]          3. Return to ED if worse     Diagnosis     Clinical Impression:   1. Priapism        Attestations:    Racquel Saenz MD    Please note that this dictation was completed with [employer] the computer voice recognition software. Quite often unanticipated grammatical, syntax, homophones, and other interpretive errors are inadvertently transcribed by the computer software. Please disregard these errors. Please excuse any errors that have escaped final proofreading. Thank you.

## 2021-01-16 NOTE — ED NOTES
MD Delmi Dorman reviewed discharge instructions with the patient. The patient verbalized understanding. Patient discharged home with stable vitals. Patient out of ED with discharge instructions in hand. Opportunity for questions and clarification provided. No further complaints noted at this time.

## 2021-01-16 NOTE — DISCHARGE INSTRUCTIONS
Please follow-up with urology doctor. Return for worsening symptoms at any time including erection that lasts > 4 hours or fever or redness.

## 2021-05-21 ENCOUNTER — HOSPITAL ENCOUNTER (OUTPATIENT)
Dept: WOUND CARE | Age: 56
Discharge: HOME | End: 2021-05-21
Admitting: PODIATRIST
Payer: MEDICARE

## 2021-05-21 VITALS
RESPIRATION RATE: 18 BRPM | DIASTOLIC BLOOD PRESSURE: 76 MMHG | TEMPERATURE: 97.8 F | SYSTOLIC BLOOD PRESSURE: 152 MMHG | HEART RATE: 80 BPM

## 2021-05-21 PROBLEM — L97.512 RIGHT FOOT ULCER, WITH FAT LAYER EXPOSED (HCC): Status: ACTIVE | Noted: 2021-05-21

## 2021-05-21 PROBLEM — L97.509 TYPE 2 DIABETES MELLITUS WITH FOOT ULCER (HCC): Status: ACTIVE | Noted: 2021-05-21

## 2021-05-21 PROBLEM — E11.621 TYPE 2 DIABETES MELLITUS WITH FOOT ULCER (HCC): Status: ACTIVE | Noted: 2021-05-21

## 2021-05-21 PROBLEM — E11.40 DIABETIC NEUROPATHY (HCC): Status: ACTIVE | Noted: 2021-05-21

## 2021-05-21 PROCEDURE — 99213 OFFICE O/P EST LOW 20 MIN: CPT

## 2021-05-21 PROCEDURE — 11042 DBRDMT SUBQ TIS 1ST 20SQCM/<: CPT

## 2021-05-21 RX ORDER — HYDROCODONE BITARTRATE AND ACETAMINOPHEN 10; 325 MG/1; MG/1
2 TABLET ORAL 2 TIMES DAILY
COMMUNITY

## 2021-05-21 RX ORDER — TAMSULOSIN HYDROCHLORIDE 0.4 MG/1
0.4 CAPSULE ORAL
COMMUNITY

## 2021-05-21 NOTE — WOUND CARE
05/21/21 1337 Wound Foot Right;Plantar 05/21/21 Date First Assessed: 05/21/21   Wound Approximate Age at First Assessment (Weeks): 4 weeks  Primary Wound Type: Diabetic  Location: Foot  Wound Location Orientation: Right;Plantar  Date of First Observation: 05/21/21 Wound Image Wound Etiology Diabetic Dressing Status Old drainage noted (Removed gauze, & tape) Cleansed Cleansed with saline Wound Length (cm) 1.5 cm Wound Width (cm) 0.9 cm Wound Depth (cm) 0.8 cm Wound Surface Area (cm^2) 1.35 cm^2 Wound Volume (cm^3) 1.08 cm^3 Wound Assessment Pink/red;Slough 
(Small amt slough visible (10%)) Drainage Amount Moderate Drainage Description Serous Wound Odor None Magali-Wound/Incision Assessment Hyperkeratosis (Callous) Edges Other (Comment) (Heavily calloused.) Wound Thickness Description Full thickness Visit Vitals BP (!) 152/76 (BP 1 Location: Left upper arm, BP Patient Position: Sitting) Pulse 80 Temp 97.8 °F (36.6 °C) Resp 18

## 2021-05-21 NOTE — H&P
[address]  HISTORY AND PHYSICAL    Name:  Shantel Tom  MR#:  [id number]  :  1965  ACCOUNT #:  [de-identified]  ADMIT DATE:  2021    HISTORY OF CHIEF COMPLAINT:  This 81-year-old white male presents with a wound on his right foot that he states is more than two years old. He states that he has previously had amputation of the first right toe and the wound continuously recurs. It is indeterminate at this time how long the wound has been currently open. PAST MEDICAL HISTORY:  Diabetes, hypertension, previous heart bypass in 2018, diabetic with neuropathy, coronary artery disease, GERD. ALLERGIES. NO KNOWN DRUG ALLERGIES. CURRENT MEDICATIONS:  Hydrocodone 10/325 two tablets two times a day as needed for pain, Flomax 0.4 mg at night, hydrochlorothiazide 25 mg daily, Nexium 20 mg daily, NovoLog insulin  units/mL on a sliding scale, Novolin R regular insulin 100 units/mL on a sliding scale, Victoza 0.6 mg/0.1 mL 1.2 mg every seven days, Metoprolol Succinate XL 25 mg daily, Plavix 75 mg daily, Lasix 20 mg daily, Mevacor 20 mg daily, metformin 1000 mg twice a day, lisinopril 20 mg daily. SOCIAL HISTORY:  The patient is a previous smoker, he quit in . PAST FAMILY HISTORY:  Not on file. REVIEW OF SYSTEMS:  No fevers, sweats, or chills. A 10-point system checked and within normal.    PHYSICAL EXAMINATION:  VITAL SIGNS:  Temperature 97.8, pulse rate 80, respirations 18, blood pressure 152/76. HEAD AND NECK:  No abnormalities. LUNGS:  Normal upon auscultation. No shortness of breath. HEART:  Heart sounds normal heart sounds. No complaint of chest pain or wheezing. ABDOMEN:  No tenderness. MUSCULOSKELETAL:  Upper and lower extremities are symmetrical with good muscle strength and tone.   EXTREMITIES:  Focused evaluation of lower extremities revealed barely palpable pedal pulses and fairly good muscle strength in lower extremities bilaterally with diminished epicritic sensation. Previous amputation at the MPJ of first and second right toes. Plantar aspect of the first right toe, there is a deep granular wound, measures 1.5 x 0.9 x 0.8 with a significant amount of surrounding hyperkeratotic tissue inconclusive if bone is probed. ASSESSMENT:  Chronic wound on plantar aspect of right foot, on a diabetic with neuropathy. PLAN:  An excisional debridement was performed of the wound on right foot and wound cultures were taken. Wound was dressed with Betadine wet-to-dry dressings and the patient is to continue daily. We discussed an x-ray versus CT scan to rule out osteomyelitis; however, the patient is concerned about cost.  Therefore, he was ordered to have an x-ray three views right foot. The patient will have followup visit in the [address] [place] [address] with myself in two weeks. Antibiotic therapy will be adjusted prior to that time as needed.         HANG [id number]  D:  05/21/2021 15:24  T:  05/21/2021 18:45  JOB #:  [id number]

## 2021-05-21 NOTE — DISCHARGE INSTRUCTIONS
Discharge Instructions/Wound [address] [facility]  [address], [address] [facility]  Telephone: [phone]     Wound Care Orders:  Right plantar foot wound - cleanse with saline, pack with betadine moistened gauze, cover with gauze, secure with roll gauze and tape on dressing only. Change dressing every other day. Follow-up in clinic in two weeks. Treatment Orders:   Elevate leg(s) above the level of the heart when sitting, if swelling present. Avoid prolonged standing in one place. Wear off-loading shoe/boot on the affected foot when walking. Do not get dressing/cast wet. Do not use objects to scratch inside cast/wrap. Follow diet as prescribed:  [x] Diet as tolerated: [x] Diabetic Diet: Low carb no sugar [x] No Added Salt:  [x] Increase Protein: [] Other:Limit the amount of liquid you are drinking and avoid drinking in between meals             Follow-up:  [x] Return Appointment: With DR Tariq Franco  in  [facility])  [] Ordered tests:      Electronically signed Gerda Morales RN on 5/21/2021 at 2:28 PM     Ingrid [phone]: Should you experience any significant changes in your wound(s) or have questions about your wound care, please contact the [address] [place] [address] at [address] [place] [address] 8:00 am - 4:30. If you need help with your wound outside these hours and cannot wait until we are again available, contact your PCP or go to the hospital emergency room. PLEASE NOTE: IF YOU ARE UNABLE TO OBTAIN WOUND SUPPLIES, CONTINUE TO USE THE SUPPLIES YOU HAVE AVAILABLE UNTIL YOU ARE ABLE TO REACH US. IT IS MOST IMPORTANT TO KEEP THE WOUND COVERED AT ALL TIMES.      Physician Signature:_______________________    Date: ___________ Time:  ____________

## 2021-05-26 LAB
MICROORGANISM/AGENT SPEC: NORMAL
MICROORGANISM/AGENT SPEC: NORMAL

## 2021-06-04 ENCOUNTER — HOSPITAL ENCOUNTER (OUTPATIENT)
Dept: GENERAL RADIOLOGY | Age: 56
Discharge: HOME | End: 2021-06-04
Payer: MEDICARE

## 2021-06-04 ENCOUNTER — HOSPITAL ENCOUNTER (OUTPATIENT)
Dept: WOUND CARE | Age: 56
Discharge: HOME | End: 2021-06-04
Admitting: PODIATRIST
Payer: MEDICARE

## 2021-06-04 ENCOUNTER — TRANSCRIBE ORDER (OUTPATIENT)
Dept: REGISTRATION | Age: 56
End: 2021-06-04

## 2021-06-04 VITALS
RESPIRATION RATE: 18 BRPM | DIASTOLIC BLOOD PRESSURE: 53 MMHG | SYSTOLIC BLOOD PRESSURE: 107 MMHG | TEMPERATURE: 97.5 F | HEART RATE: 69 BPM

## 2021-06-04 DIAGNOSIS — S91.301A OPEN WOUND OF RIGHT FOOT: Primary | ICD-10-CM

## 2021-06-04 DIAGNOSIS — S91.301A OPEN WOUND OF RIGHT FOOT: ICD-10-CM

## 2021-06-04 PROCEDURE — 11042 DBRDMT SUBQ TIS 1ST 20SQCM/<: CPT

## 2021-06-04 PROCEDURE — 73630 X-RAY EXAM OF FOOT: CPT

## 2021-06-04 RX ORDER — DOXYCYCLINE 100 MG/1
100 TABLET ORAL 2 TIMES DAILY
Qty: 20 TABLET | Refills: 1 | Status: SHIPPED | OUTPATIENT
Start: 2021-06-04 | End: 2021-07-30

## 2021-06-04 NOTE — WOUND CARE
06/04/21 1343 Wound Foot Right;Plantar 05/21/21 Date First Assessed: 05/21/21   Wound Approximate Age at First Assessment (Weeks): 4 weeks  Primary Wound Type: Diabetic  Location: Foot  Wound Location Orientation: Right;Plantar  Date of First Observation: 05/21/21 Dressing Status New dressing applied Cleansed Cleansed with saline Dressing/Treatment Betadine swabs/Povidone Iodine;Moist to dry;Gauze dressing/dressing sponge;Roll gauze;Tape/Soft cloth adhesive tape

## 2021-06-04 NOTE — WOUND CARE
Debridement Wound Care Problem List Items Addressed This Visit None Procedure Note Indications:  Based on my examination of this patient's wound(s)/ulcer(s) today, debridement is required to promote healing and evaluate the wound base. Performed by: Nahid Gomez DPM 
 
Consent obtained: Yes Time out taken: Yes Debridement: Excisional 
 
Using curette the wound(s)/ulcer(s) was/were sharply debrided down through and including the removal of   
subcutaneous tissue Devitalized Tissue Debrided: necrotic/eschar 
 
Pre Debridement Measurements: 
Are located in the [place]  Documentation Flow Sheet Wound/Ulcer #: 1 Post Debridement Measurements: 
Wound/Ulcer Descriptions are Pre Debridement except measurements:Diabetic ulcer, fat layer exposed Wound Foot Right;Plantar 05/21/21 (Active) Wound Image   06/04/21 1313 Wound Etiology Diabetic 06/04/21 1313 Dressing Status New dressing applied 05/21/21 1435 Cleansed Cleansed with saline 06/04/21 1313 Dressing/Treatment Open to air 06/04/21 1313 Wound Length (cm) 1.4 cm 06/04/21 1313 Wound Width (cm) 1.5 cm 06/04/21 1313 Wound Depth (cm) 0.4 cm 06/04/21 1313 Wound Surface Area (cm^2) 2.1 cm^2 06/04/21 1313 Change in Wound Size % -55.56 06/04/21 1313 Wound Volume (cm^3) 0.84 cm^3 06/04/21 1313 Wound Healing % 22 06/04/21 1313 Post-Procedure Length (cm) 1.8 cm 05/21/21 1337 Post-Procedure Width (cm) 1.3 cm 05/21/21 1337 Post-Procedure Depth (cm) 0.9 cm 05/21/21 1337 Post-Procedure Surface Area (cm^2) 2.34 cm^2 05/21/21 1337 Post-Procedure Volume (cm^3) 2.11 cm^3 05/21/21 1337 Wound Assessment Pink/red 06/04/21 1313 Drainage Amount Small 06/04/21 1313 Drainage Description Serous 06/04/21 1313 Wound Odor None 06/04/21 1313 Magali-Wound/Incision Assessment Hyperkeratosis (Callous) 06/04/21 1313 Edges Other (Comment) 06/04/21 1313 Wound Thickness Description Full thickness 06/04/21 7796 Number of days: 14 Percent of Wound(s)/Ulcer(s) Debrided: 100% Total Surface Area Debrided:  2.1 sq cm Diabetic/Pressure/Non Pressure Ulcers only: 
Ulcer:  
 
Estimated Blood Loss:  Minimal  
 
Hemostasis Achieved: Pressure Procedural Pain: 0 / 10 Post Procedural Pain: 0 / 10 Response to treatment: Well tolerated by patient Wound cultures from last visit showed heavy growth of normal kitty. Wound cultures repeated today because the 1st right toe was erythematous but no warmth. Wound dressed with betadine and gauze; patient to continue daily. Failed to have xrays taken last week; 3 views right foot reordered today

## 2021-06-04 NOTE — DISCHARGE INSTRUCTIONS
Discharge Instructions/Wound [address] [place] [address]  [address] [facility]  [place], [address]  Telephone: [phone]     Wound Care Orders:  Right plantar foot wound - cleanse with saline, pack with betadine moistened gauze, cover with gauze, secure with roll gauze and tape on dressing only. Change dressing every other day. Follow-up in clinic in two weeks. Treatment Orders:   Elevate leg(s) above the level of the heart when sitting, if swelling present. Avoid prolonged standing in one place. Wear off-loading shoe/boot on the affected foot when walking. Do not get dressing/cast wet. Do not use objects to scratch inside cast/wrap. Follow diet as prescribed:  [x] Diet as tolerated: [x] Diabetic Diet: Low carb no sugar [x] No Added Salt:  [x] Increase Protein: [] Other:Limit the amount of liquid you are drinking and avoid drinking in between meals             Follow-up:  [x] Return Appointment: With DR Victor Manuel Collins  in  [address] [place] [address])  [] Ordered tests:       We're moving, Effective July 6, 2021  New Address is  [address] [facility]  MOB 1, [address] [facility] [place], [id number]   Electronically signed Radha Waller RN on 6/4/2021 at 1:29 PM     [address] [place] [address] Information: Should you experience any significant changes in your wound(s) or have questions about your wound care, please contact the [address] [place] [address] at [address] [place] [address] 8:00 am - 4:30. If you need help with your wound outside these hours and cannot wait until we are again available, contact your PCP or go to the hospital emergency room. PLEASE NOTE: IF YOU ARE UNABLE TO OBTAIN WOUND SUPPLIES, CONTINUE TO USE THE SUPPLIES YOU HAVE AVAILABLE UNTIL YOU ARE ABLE TO REACH US. IT IS MOST IMPORTANT TO KEEP THE WOUND COVERED AT ALL TIMES.      Physician Signature:_______________________    Date: ___________ Time:  ____________

## 2021-06-04 NOTE — WOUND CARE
06/04/21 1313 Wound Foot Right;Plantar 05/21/21 Date First Assessed: 05/21/21   Wound Approximate Age at First Assessment (Weeks): 4 weeks  Primary Wound Type: Diabetic  Location: Foot  Wound Location Orientation: Right;Plantar  Date of First Observation: 05/21/21 Wound Image Wound Etiology Diabetic Cleansed Cleansed with saline Dressing/Treatment Open to air Wound Length (cm) 1.4 cm Wound Width (cm) 1.5 cm Wound Depth (cm) 0.4 cm Wound Surface Area (cm^2) 2.1 cm^2 Change in Wound Size % -55.56 Wound Volume (cm^3) 0.84 cm^3 Wound Healing % 22 Wound Assessment Pink/red Drainage Amount Small Drainage Description Serous Wound Odor None Magali-Wound/Incision Assessment Hyperkeratosis (Callous) Edges Other (Comment) (callous) Wound Thickness Description Full thickness

## 2021-06-10 LAB
MICROORGANISM/AGENT SPEC: ABNORMAL

## 2021-06-18 ENCOUNTER — HOSPITAL ENCOUNTER (OUTPATIENT)
Dept: WOUND CARE | Age: 56
Discharge: HOME | End: 2021-06-18
Admitting: PODIATRIST
Payer: MEDICARE

## 2021-06-18 VITALS
HEART RATE: 71 BPM | TEMPERATURE: 96.9 F | RESPIRATION RATE: 18 BRPM | DIASTOLIC BLOOD PRESSURE: 77 MMHG | SYSTOLIC BLOOD PRESSURE: 177 MMHG

## 2021-06-18 PROCEDURE — 11042 DBRDMT SUBQ TIS 1ST 20SQCM/<: CPT

## 2021-06-18 NOTE — WOUND CARE
06/18/21 1421 Wound Foot Right;Plantar 05/21/21 Date First Assessed: 05/21/21   Wound Approximate Age at First Assessment (Weeks): 4 weeks  Primary Wound Type: Diabetic  Location: Foot  Wound Location Orientation: Right;Plantar  Date of First Observation: 05/21/21 Dressing Status New dressing applied Cleansed Cleansed with saline Dressing/Treatment Betadine swabs/Povidone Iodine;Gauze dressing/dressing sponge;Moist to dry; Roll gauze;Tape/Soft cloth adhesive tape

## 2021-06-18 NOTE — DISCHARGE INSTRUCTIONS
Discharge Instructions/Wound [address] [place] [address]  [address] [facility]  [place], [address] [facility]  Telephone: [phone]     Wound Care Orders:  TreatmeRight plantar foot wound - cleanse with saline, pack with betadine moistened gauze, cover with gauze, secure with roll gauze and tape on dressing only. Change dressing every other day. Follow-up in clinic in 1 week. Orders:   Elevate leg(s) above the level of the heart when sitting, if swelling present. Avoid prolonged standing in one place. Wear off-loading shoe/boot on the affected foot when walking. Do not get dressing/cast wet. Do not use objects to scratch inside cast/wrap. Follow diet as prescribed:  [x] Diet as tolerated: [x] Diabetic Diet: Low carb no sugar [] No Added Salt:  [x] Increase Protein: [] Other:Limit the amount of liquid you are drinking and avoid drinking in between meals             Follow-up:  [x] Return Appointment: With DR Keith Luna  in  [address] [place] [address])  [] Ordered tests:     [facility]  [address] [place] [address]  [facility] [address]  [phone]    June 22, 2021  10:30 arrive 10:15    [facility] [place] moving, Effective July 6, 2021  New Address is  [address] [place] [address] 1, [address] [place] [address] [place]  [place], [id number]   Electronically signed Heber Butler RN on 6/18/2021 at 2:10 PM     [address] [facility] Information: Should you experience any significant changes in your wound(s) or have questions about your wound care, please contact the [address] [place] [address] at [address] [place] [address] 8:00 am - 4:30. If you need help with your wound outside these hours and cannot wait until we are again available, contact your PCP or go to the hospital emergency room. PLEASE NOTE: IF YOU ARE UNABLE TO OBTAIN WOUND SUPPLIES, CONTINUE TO USE THE SUPPLIES YOU HAVE AVAILABLE UNTIL YOU ARE ABLE TO REACH US. IT IS MOST IMPORTANT TO KEEP THE WOUND COVERED AT ALL TIMES.      Physician Signature:_______________________    Date: ___________ Time:  ____________

## 2021-06-18 NOTE — WOUND CARE
Debridement Wound Care Problem List Items Addressed This Visit None Procedure Note Indications:  Based on my examination of this patient's wound(s)/ulcer(s) today, debridement is required to promote healing and evaluate the wound base. Performed by: Zachary Gregg DPM 
 
Consent obtained: Yes Time out taken: Yes Debridement: Excisional 
 
Using curette the wound(s)/ulcer(s) was/were sharply debrided down through and including the removal of   
subcutaneous tissue Devitalized Tissue Debrided: necrotic/eschar 
 
Pre Debridement Measurements: 
Are located in the [facility]  Documentation Flow Sheet Wound/Ulcer #: 1 Post Debridement Measurements: 
Wound/Ulcer Descriptions are Pre Debridement except measurements:Diabetic ulcer, fat layer exposed Wound Foot Right;Plantar 05/21/21 (Active) Wound Image   06/18/21 1311 Wound Etiology Diabetic 06/18/21 1311 Dressing Status New dressing applied 06/18/21 1421 Cleansed Cleansed with saline 06/18/21 1421 Dressing/Treatment Betadine swabs/Povidone Iodine;Gauze dressing/dressing sponge;Moist to dry; Roll gauze;Tape/Soft cloth adhesive tape 06/18/21 1421 Wound Length (cm) 1.5 cm 06/18/21 1311 Wound Width (cm) 1 cm 06/18/21 1311 Wound Depth (cm) 0.7 cm 06/18/21 1311 Wound Surface Area (cm^2) 1.5 cm^2 06/18/21 1311 Change in Wound Size % -11.11 06/18/21 1311 Wound Volume (cm^3) 1.05 cm^3 06/18/21 1311 Wound Healing % 3 06/18/21 1311 Post-Procedure Length (cm) 1.4 cm 06/04/21 1313 Post-Procedure Width (cm) 1.5 cm 06/04/21 1313 Post-Procedure Depth (cm) 0.4 cm 06/04/21 1313 Post-Procedure Surface Area (cm^2) 2.1 cm^2 06/04/21 1313 Post-Procedure Volume (cm^3) 0.84 cm^3 06/04/21 1313 Wound Assessment Pink/red 06/18/21 1311 Drainage Amount Small 06/18/21 1311 Drainage Description Serous 06/18/21 1311 Wound Odor None 06/18/21 1311 Magali-Wound/Incision Assessment Hyperkeratosis (Callous) 06/18/21 0201 Edges Other (Comment) 06/04/21 1313 Wound Thickness Description Full thickness 06/18/21 1311 Number of days: 28 Percent of Wound(s)/Ulcer(s) Debrided: 100% Total Surface Area Debrided:  1.5 sq cm Diabetic/Pressure/Non Pressure Ulcers only: 
Ulcer:  
 
Estimated Blood Loss:  Minimal  
 
Hemostasis Achieved: Pressure Procedural Pain: 0 / 10 Post Procedural Pain: 0 / 10 Response to treatment: Well tolerated by patient Xray reviewed and there appears to be an underlying hyperostosis associated with the wound. The hyperostosis is lytic and osteomyelitis can not be ruled out.   Discussed resection of remaninig remnant of the 1st right proximal phalanx pending YEN's

## 2021-06-18 NOTE — WOUND CARE
06/18/21 1311 Wound Foot Right;Plantar 05/21/21 Date First Assessed: 05/21/21   Wound Approximate Age at First Assessment (Weeks): 4 weeks  Primary Wound Type: Diabetic  Location: Foot  Wound Location Orientation: Right;Plantar  Date of First Observation: 05/21/21 Wound Image Wound Etiology Diabetic Dressing Status Old drainage noted Cleansed Cleansed with saline Wound Length (cm) 1.5 cm Wound Width (cm) 1 cm Wound Depth (cm) 0.7 cm Wound Surface Area (cm^2) 1.5 cm^2 Change in Wound Size % -11.11 Wound Volume (cm^3) 1.05 cm^3 Wound Healing % 3 Wound Assessment Pink/red Drainage Amount Small Drainage Description Serous Wound Odor None Magali-Wound/Incision Assessment Hyperkeratosis (Callous) Wound Thickness Description Full thickness Visit Vitals BP (!) 177/77 (BP 1 Location: Left upper arm, BP Patient Position: Sitting) Pulse 71 Temp 96.9 °F (36.1 °C) Resp 18

## 2021-06-24 ENCOUNTER — HOSPITAL ENCOUNTER (OUTPATIENT)
Dept: PREADMISSION TESTING | Age: 56
Discharge: HOME | End: 2021-06-24

## 2021-06-24 VITALS
HEART RATE: 71 BPM | HEIGHT: 70 IN | BODY MASS INDEX: 45.1 KG/M2 | SYSTOLIC BLOOD PRESSURE: 162 MMHG | TEMPERATURE: 98.5 F | OXYGEN SATURATION: 97 % | DIASTOLIC BLOOD PRESSURE: 71 MMHG | WEIGHT: 315 LBS

## 2021-06-24 NOTE — PERIOP NOTES
[facility]  Preoperative Instructions      Surgery Date Monday, June 28, 2021              Time of Arrival  10:00 a    1. On the day of your surgery, please report to the [facility] Entrance. If arriving prior to 7 AM please enter through the Emergency Room Entrance. 2. You must have a responsible adult to drive you to the hospital, stay at the hospital during your surgery and drive you home. You should have someone stay with you for the first 24 hours after your surgery. You should not drive a car for 24 hours following surgery. 3. Do not have anything to eat or drink ( including water, gum, mints, coffee, juice) after midnight Sunday, June 27th, 2021. This may not apply to medications prescribed by your physician. Please note special instructions, if applicable. If you are currently taking Plavix, Coumadin, or other blood-thinning agents, contact your surgeon for instructions. 4. We recommend you do not drink any alcoholic beverages for 24 hours before and after your surgery. 5. Have a list of all current medications, including vitamins, herbal supplements and any other over the counter medications. Stop Asprin and non-steroidal anti-inflammatory drugs (I.e. Advil, Aleve) as directed by your surgeon's office. Stop all vitamins and herbal supplements seven days prior to your surgery. 6. Wear comfortable clothes. Wear glasses instead of contacts. Do not bring any money or jewelry. Do not wear make-up, particularly mascara, the morning of your surgery. Do not wear nail polish, particularly if you are having foot /hand surgery. Wear your hair loose or down, no ponytails, buns, juan a pins or clips. All body piercings must be removed. Please shower before surgery with an antibacterial soap (Safeguard/Dial) and use a clean towel. Do not apply any lotions, powders, cologne, perfume or deodorants afterward.     Do not shave the area around your surgical incision for at least two to three days prior to your surgery. If you wear glasses, contacts, dentures and/or hearing aids, they will be removed prior to surgery. 7. You should understand that if you do not follow these instructions your surgery may be cancelled. If your physical condition changes (I.e. fever, cold or flu) please contact your surgeon as soon as possible. 8. It is important that you be on time. If a situation occurs where you may be late, please call [phone]    9. If you are feeling sick before surgery, call your surgeon. He or she will tell you what to do. If you are sick on the day of your surgery please call [phone].     10. If you have any questions and or problems, please call [phone] (Pre-admission Testing). 11. Your surgery time may be subject to change. You will receive a phone call the evening before your surgery if your time changes. Special Instructions: BRING YOUR VACCINE CARD    MEDICATIONS TO TAKE THE MORNING OF SURGERY WITH A SIP OF WATER: METOPROLOL AND HYDROCHOLORTHIAZIDE    I understand a pre-operative phone call will be made to verify my surgery time. In the event that I am not available, I give permission for a message to be left on my answering service and/or with another person?    yes           ___________________      ___________________  _________  (Signature of Patient)          (Witness)                              (Date and Time)

## 2021-06-25 ENCOUNTER — ANESTHESIA EVENT (OUTPATIENT)
Dept: SURGERY | Age: 56
End: 2021-06-25
Payer: MEDICARE

## 2021-06-25 RX ORDER — SODIUM CHLORIDE 0.9 % (FLUSH) 0.9 %
5-40 SYRINGE (ML) INJECTION AS NEEDED
Status: CANCELLED | OUTPATIENT
Start: 2021-06-25

## 2021-06-25 RX ORDER — SODIUM CHLORIDE 0.9 % (FLUSH) 0.9 %
5-40 SYRINGE (ML) INJECTION EVERY 8 HOURS
Status: CANCELLED | OUTPATIENT
Start: 2021-06-25

## 2021-06-28 ENCOUNTER — HOSPITAL ENCOUNTER (OUTPATIENT)
Age: 56
Setting detail: OUTPATIENT SURGERY
Discharge: HOME | End: 2021-06-28
Attending: PODIATRIST | Admitting: PODIATRIST
Payer: MEDICARE

## 2021-06-28 ENCOUNTER — ANESTHESIA (OUTPATIENT)
Dept: SURGERY | Age: 56
End: 2021-06-28
Payer: MEDICARE

## 2021-06-28 VITALS
BODY MASS INDEX: 45.1 KG/M2 | HEIGHT: 70 IN | WEIGHT: 315 LBS | RESPIRATION RATE: 14 BRPM | HEART RATE: 72 BPM | DIASTOLIC BLOOD PRESSURE: 78 MMHG | SYSTOLIC BLOOD PRESSURE: 166 MMHG | TEMPERATURE: 98.3 F | OXYGEN SATURATION: 92 %

## 2021-06-28 DIAGNOSIS — E08.621 DIABETIC ULCER OF MIDFOOT ASSOCIATED WITH DIABETES MELLITUS DUE TO UNDERLYING CONDITION, WITH FAT LAYER EXPOSED, UNSPECIFIED LATERALITY (HCC): Primary | ICD-10-CM

## 2021-06-28 DIAGNOSIS — L97.402 DIABETIC ULCER OF MIDFOOT ASSOCIATED WITH DIABETES MELLITUS DUE TO UNDERLYING CONDITION, WITH FAT LAYER EXPOSED, UNSPECIFIED LATERALITY (HCC): Primary | ICD-10-CM

## 2021-06-28 LAB
GLUCOSE BLD STRIP.AUTO-MCNC: 186 MG/DL (ref 65–117)
SERVICE CMNT-IMP: ABNORMAL

## 2021-06-28 PROCEDURE — 74011000250 HC RX REV CODE- 250: Performed by: PODIATRIST

## 2021-06-28 PROCEDURE — 77030000032 HC CUF TRNQT ZIMM -B: Performed by: PODIATRIST

## 2021-06-28 PROCEDURE — 76210000020 HC REC RM PH II FIRST 0.5 HR: Performed by: PODIATRIST

## 2021-06-28 PROCEDURE — 88311 DECALCIFY TISSUE: CPT

## 2021-06-28 PROCEDURE — 88305 TISSUE EXAM BY PATHOLOGIST: CPT

## 2021-06-28 PROCEDURE — 82962 GLUCOSE BLOOD TEST: CPT

## 2021-06-28 PROCEDURE — 87075 CULTR BACTERIA EXCEPT BLOOD: CPT

## 2021-06-28 PROCEDURE — 87205 SMEAR GRAM STAIN: CPT

## 2021-06-28 PROCEDURE — 87186 SC STD MICRODIL/AGAR DIL: CPT

## 2021-06-28 PROCEDURE — 2709999900 HC NON-CHARGEABLE SUPPLY: Performed by: PODIATRIST

## 2021-06-28 PROCEDURE — 74011250636 HC RX REV CODE- 250/636: Performed by: ANESTHESIOLOGY

## 2021-06-28 PROCEDURE — 77030002916 HC SUT ETHLN J&J -A: Performed by: PODIATRIST

## 2021-06-28 PROCEDURE — 77030036687 HC SHOE PSTOP S2SG -A

## 2021-06-28 PROCEDURE — 76010000138 HC OR TIME 0.5 TO 1 HR: Performed by: PODIATRIST

## 2021-06-28 PROCEDURE — 76060000032 HC ANESTHESIA 0.5 TO 1 HR: Performed by: PODIATRIST

## 2021-06-28 PROCEDURE — 87077 CULTURE AEROBIC IDENTIFY: CPT

## 2021-06-28 PROCEDURE — 74011000258 HC RX REV CODE- 258: Performed by: ANESTHESIOLOGY

## 2021-06-28 RX ORDER — BUPIVACAINE HYDROCHLORIDE AND EPINEPHRINE 2.5; 5 MG/ML; UG/ML
INJECTION, SOLUTION EPIDURAL; INFILTRATION; INTRACAUDAL; PERINEURAL AS NEEDED
Status: DISCONTINUED | OUTPATIENT
Start: 2021-06-28 | End: 2021-06-28 | Stop reason: HOSPADM

## 2021-06-28 RX ORDER — FENTANYL CITRATE 50 UG/ML
INJECTION, SOLUTION INTRAMUSCULAR; INTRAVENOUS AS NEEDED
Status: DISCONTINUED | OUTPATIENT
Start: 2021-06-28 | End: 2021-06-28 | Stop reason: HOSPADM

## 2021-06-28 RX ORDER — SODIUM CHLORIDE 9 MG/ML
50 INJECTION, SOLUTION INTRAVENOUS CONTINUOUS
Status: DISCONTINUED | OUTPATIENT
Start: 2021-06-28 | End: 2021-06-28 | Stop reason: HOSPADM

## 2021-06-28 RX ORDER — MIDAZOLAM HYDROCHLORIDE 1 MG/ML
INJECTION, SOLUTION INTRAMUSCULAR; INTRAVENOUS AS NEEDED
Status: DISCONTINUED | OUTPATIENT
Start: 2021-06-28 | End: 2021-06-28 | Stop reason: HOSPADM

## 2021-06-28 RX ORDER — LIDOCAINE HYDROCHLORIDE 10 MG/ML
0.1 INJECTION, SOLUTION EPIDURAL; INFILTRATION; INTRACAUDAL; PERINEURAL AS NEEDED
Status: DISCONTINUED | OUTPATIENT
Start: 2021-06-28 | End: 2021-06-28 | Stop reason: HOSPADM

## 2021-06-28 RX ORDER — SODIUM CHLORIDE, SODIUM LACTATE, POTASSIUM CHLORIDE, CALCIUM CHLORIDE 600; 310; 30; 20 MG/100ML; MG/100ML; MG/100ML; MG/100ML
50 INJECTION, SOLUTION INTRAVENOUS CONTINUOUS
Status: DISCONTINUED | OUTPATIENT
Start: 2021-06-28 | End: 2021-06-28 | Stop reason: HOSPADM

## 2021-06-28 RX ORDER — SODIUM CHLORIDE, SODIUM LACTATE, POTASSIUM CHLORIDE, CALCIUM CHLORIDE 600; 310; 30; 20 MG/100ML; MG/100ML; MG/100ML; MG/100ML
INJECTION, SOLUTION INTRAVENOUS
Status: DISCONTINUED | OUTPATIENT
Start: 2021-06-28 | End: 2021-06-28 | Stop reason: HOSPADM

## 2021-06-28 RX ORDER — DOXYCYCLINE 100 MG/1
100 TABLET ORAL 2 TIMES DAILY
Qty: 20 TABLET | Refills: 1 | Status: SHIPPED | OUTPATIENT
Start: 2021-06-28 | End: 2021-07-16

## 2021-06-28 RX ORDER — SODIUM CHLORIDE 0.9 % (FLUSH) 0.9 %
5-40 SYRINGE (ML) INJECTION EVERY 8 HOURS
Status: DISCONTINUED | OUTPATIENT
Start: 2021-06-28 | End: 2021-06-28 | Stop reason: HOSPADM

## 2021-06-28 RX ORDER — SODIUM CHLORIDE 0.9 % (FLUSH) 0.9 %
5-40 SYRINGE (ML) INJECTION AS NEEDED
Status: DISCONTINUED | OUTPATIENT
Start: 2021-06-28 | End: 2021-06-28 | Stop reason: HOSPADM

## 2021-06-28 RX ORDER — OXYCODONE AND ACETAMINOPHEN 5; 325 MG/1; MG/1
1 TABLET ORAL
Qty: 60 TABLET | Refills: 0 | Status: SHIPPED | OUTPATIENT
Start: 2021-06-28 | End: 2021-07-05

## 2021-06-28 RX ADMIN — FENTANYL CITRATE 50 MCG: 50 INJECTION, SOLUTION INTRAMUSCULAR; INTRAVENOUS at 12:32

## 2021-06-28 RX ADMIN — FENTANYL CITRATE 50 MCG: 50 INJECTION, SOLUTION INTRAMUSCULAR; INTRAVENOUS at 12:24

## 2021-06-28 RX ADMIN — MIDAZOLAM HYDROCHLORIDE 2 MG: 2 INJECTION, SOLUTION INTRAMUSCULAR; INTRAVENOUS at 12:09

## 2021-06-28 RX ADMIN — SODIUM CHLORIDE, POTASSIUM CHLORIDE, SODIUM LACTATE AND CALCIUM CHLORIDE: 600; 310; 30; 20 INJECTION, SOLUTION INTRAVENOUS at 11:22

## 2021-06-28 RX ADMIN — CEFAZOLIN 3 G: 10 INJECTION, POWDER, FOR SOLUTION INTRAVENOUS; PARENTERAL at 12:08

## 2021-06-28 NOTE — DISCHARGE INSTRUCTIONS
Patient Education        Learning About Coronavirus [phone]  What is coronavirus (COVID-19)? COVID-19 is a disease caused by a new type of coronavirus. This illness was first found in December 2019. It has since spread worldwide. Coronaviruses are a large group of viruses. They cause the common cold. They also cause more serious illnesses like Middle East respiratory syndrome (MERS) and severe acute respiratory syndrome (SARS). COVID-19 is caused by a novel coronavirus. That means it's a new type that has not been seen in people before. What are the symptoms? Coronavirus (COVID-19) symptoms may include:  · Fever. · Cough. · Trouble breathing. · Chills or repeated shaking with chills. · Muscle pain. · Headache. · Sore throat. · New loss of taste or smell. · Vomiting. · Diarrhea. In severe cases, COVID-19 can cause pneumonia and make it hard to breathe without help from a machine. It can cause death. How is it diagnosed? COVID-19 is diagnosed with a viral test. This may also be called a PCR test or antigen test. It looks for evidence of the virus in your breathing passages or lungs (respiratory system). The test is most often done on a sample from the nose, throat, or lungs. It's sometimes done on a sample of saliva. One way a sample is collected is by putting a long swab into the back of your nose. How is it treated? Mild cases of COVID-19 can be treated at home. Serious cases need treatment in the hospital. Treatment may include medicines to reduce symptoms, plus breathing support such as oxygen therapy or a ventilator. Some people may be placed on their belly to help their oxygen levels. Treatments that may help people who have COVID-19 include:  Antiviral medicines. These medicines treat viral infections. Remdesivir is an example. Immune-based therapy. These medicines help the immune system fight COVID-19. One example is bamlanivimab. It's a monoclonal antibody. Blood thinners. These medicines help prevent blood clots. People with severe illness are at risk for blood clots. How can you protect yourself and others? The best way to protect yourself from getting sick is to:  · Avoid areas where there is an outbreak. · Avoid contact with people who may be infected. · Avoid crowds and try to stay at least 6 feet away from other people. · Wash your hands often, especially after you cough or sneeze. Use soap and water, and scrub for at least 20 seconds. If soap and water aren't available, use an alcohol-based hand . · Avoid touching your mouth, nose, and eyes. To help avoid spreading the virus to others:  · Stay home if you are sick or have been exposed to the virus. Don't go to school, work, or public areas. And don't use public transportation, ride-shares, or taxis unless you have no choice. · Wear a cloth face cover if you have to go to public areas. · Cover your mouth with a tissue when you cough or sneeze. Then throw the tissue in the trash and wash your hands right away. · If you're sick:  ? Leave your home only if you need to get medical care. But call the doctor's office first so they know you're coming. And wear a face cover. ? Wear the face cover whenever you're around other people. It can help stop the spread of the virus when you cough or sneeze. ? Limit contact with pets and people in your home. If possible, stay in a separate bedroom and use a separate bathroom. ? Clean and disinfect your home every day. Use household  and disinfectant wipes or sprays. Take special care to clean things that you grab with your hands. These include doorknobs, remote controls, phones, and handles on your refrigerator and microwave. And don't forget countertops, tabletops, bathrooms, and computer keyboards. When should you call for help? Call 911 anytime you think you may need emergency care.  For example, call if you have life-threatening symptoms, such as:    · You have severe trouble breathing. (You can't talk at all.)     · You have constant chest pain or pressure.     · You are severely dizzy or lightheaded.     · You are confused or can't think clearly.     · Your face and lips have a blue color.     · You pass out (lose consciousness) or are very hard to wake up. Call your doctor now or seek immediate medical care if:    · You have moderate trouble breathing. (You can't speak a full sentence.)     · You are coughing up blood (more than about 1 teaspoon).     · You have signs of low blood pressure. These include feeling lightheaded; being too weak to stand; and having cold, pale, clammy skin. Watch closely for changes in your health, and be sure to contact your doctor if:    · Your symptoms get worse.     · You are not getting better as expected. Call before you go to the doctor's office. Follow their instructions. And wear a cloth face cover. Current as of: December 18, 2020               Content Version: 12.8  © 2006-2021 [employer]. Care instructions adapted under license by [employer] (which disclaims liability or warranty for this information). If you have questions about a medical condition or this instruction, always ask your healthcare professional. [place] [address] any warranty or liability for your use of this information. DISCHARGE SUMMARY from Nurse    PATIENT INSTRUCTIONS:    After general anesthesia or intravenous sedation, for 24 hours or while taking prescription Narcotics:  · Limit your activities  · Do not drive and operate hazardous machinery  · Do not make important personal or business decisions  · Do  not drink alcoholic beverages  · If you have not urinated within 8 hours after discharge, please contact your surgeon on call.     Report the following to your surgeon:  · Excessive pain, swelling, redness or odor of or around the surgical area  · Temperature over 100.5  · Nausea and vomiting lasting longer than 4 hours or if unable to take medications  · Any signs of decreased circulation or nerve impairment to extremity: change in color, persistent  numbness, tingling, coldness or increase pain  · Any questions      These are general instructions for a healthy lifestyle:    No smoking/ No tobacco products/ Avoid exposure to second hand smoke  Surgeon General's Warning:  Quitting smoking now greatly reduces serious risk to your health. Obesity, smoking, and sedentary lifestyle greatly increases your risk for illness    A healthy diet, regular physical exercise & weight monitoring are important for maintaining a healthy lifestyle    You may be retaining fluid if you have a history of heart failure or if you experience any of the following symptoms:  Weight gain of 3 pounds or more overnight or 5 pounds in a week, increased swelling in our hands or feet or shortness of breath while lying flat in bed. Please call your doctor as soon as you notice any of these symptoms; do not wait until your next office visit. The discharge information has been reviewed with the patient and spouse. The patient and spouse verbalized understanding. Discharge medications reviewed with the patient and spouse and appropriate educational materials and side effects teaching were provided.   ___________________________________________________________________________________________________________________________________

## 2021-06-28 NOTE — ANESTHESIA POSTPROCEDURE EVALUATION
Procedure(s):  AMPUTATION FIRST RIGHT TOE. MAC    Anesthesia Post Evaluation      Multimodal analgesia: multimodal analgesia not used between 6 hours prior to anesthesia start to PACU discharge  Patient location during evaluation: PACU  Patient participation: complete - patient participated  Level of consciousness: awake and alert  Pain management: adequate  Airway patency: patent  Anesthetic complications: no  Cardiovascular status: acceptable  Respiratory status: acceptable  Hydration status: acceptable  Post anesthesia nausea and vomiting:  none  Final Post Anesthesia Temperature Assessment:  Normothermia (36.0-37.5 degrees C)      INITIAL Post-op Vital signs:   Vitals Value Taken Time   /78 06/28/21 1320   Temp 36.8 °C (98.3 °F) 06/28/21 1315   Pulse 72 06/28/21 1322   Resp 15 06/28/21 1322   SpO2 90 % 06/28/21 1322   Vitals shown include unvalidated device data.

## 2021-06-28 NOTE — BRIEF OP NOTE
Brief Postoperative Note    Patient: Anu Alexandre  YOB: 1965  MRN: [id number]    Date of Procedure: 6/28/2021     Pre-Op Diagnosis: OSTEOMYELITIS FIRST RIGHT TOE    Post-Op Diagnosis: Same as preoperative diagnosis.       Procedure(s):  AMPUTATION FIRST RIGHT TOE    Surgeon(s):  Sia Rob DPM    Surgical Assistant: None    Anesthesia: MAC     Estimated Blood Loss (mL): Minimal    Complications: None    Specimens:   ID Type Source Tests Collected by Time Destination   1 : Proximal Phalanx First Right Toe Preservative Toe  Sia Rob DPM 6/28/2021 1234 Pathology   1 : Right First Toe Wound Wound Toe CULTURE, ANAEROBIC, CULTURE, WOUND W GRAM STAIN Francis Roy 6/28/2021 1234 Microbiology        Implants: * No implants in log *    Drains: * No LDAs found *    Findings: irregular bone    Electronically Signed by Lianet Her DPM on 6/28/2021 at 12:54 PM

## 2021-06-28 NOTE — PERIOP NOTES
Patient meets discharge criteria. Patient and wife  provided with verbal and written discharge instructions. Patient discharged by wheelchair with Wife to transport home.

## 2021-06-28 NOTE — PERIOP NOTES
Permission received to review discharge instructions and discuss private health information with  Patient's wife, Marlinda Merlin. Patient states that his wife, Marlinda Merlin will be with pt for at least 24 hours following today's procedure. Warm blanket(s) given to pt. Pt states they are comfortable, will continue to assess.

## 2021-06-28 NOTE — H&P
History and Physical    Subjective:     Nitin Trotter is a 55 y.o.  male who presents with chronic wound 1st right toe of several months. Previous partial amputation 1st right toe more than a year ago. Onset of symptoms was gradual with gradually worsening course since that time. Patient denies any pain. Previous studies include xray. Past Medical History:   Diagnosis Date    Adverse effect of anesthesia     general anesthesia and he didnt wake up for 5 days     Allergy to alpha-gal     CAD (coronary artery disease) 2019    triple bypass - at [facility]    Chronic pain     bilaterial foot pain    Diabetes ([facility] [address]     type 2    Diabetic neuropathy (HCC)     GERD (gastroesophageal reflux disease)     Hypertension     Ill-defined condition     Diabetic peripheral neuropathy    Morbid obesity ([facility] [address]     Sleep apnea     untreated - patient states machine dries our his mouth and nares so he can't breath      Past Surgical History:   Procedure Laterality Date    COLONOSCOPY N/A 10/14/2020    COLONOSCOPY performed by Terry Manzano MD at [facility] ENDOSCOPY    HX OTHER SURGICAL      two toes amputated from right foot, the great toe and next one    DC CABG, ARTERY-VEIN, THREE  2018    CABG    DC CARDIAC SURG PROCEDURE UNLIST      stent x1     History reviewed. No pertinent family history. Social History     Tobacco Use    Smoking status: Former Smoker     Packs/day: 2.00     Years: 30.00     Pack years: 60.00     Quit date:      Years since quittin.4    Smokeless tobacco: Never Used   Substance Use Topics    Alcohol use: No       Prior to Admission medications    Medication Sig Start Date End Date Taking? Authorizing Provider   doxycycline (ADOXA) 100 mg tablet Take 1 Tablet by mouth two (2) times a day.  Indications: chronic foot wound  Patient not taking: Reported on 2021   Landy Rodriguez DPM   HYDROcodone-acetaminophen [facility])  mg tablet Take 2 Tablets by mouth two (2) times a day. 2.5 tablets bid as needed for pain   Indications: pain  Patient not taking: Reported on 6/24/2021    Provider, Historical   tamsulosin (Flomax) 0.4 mg capsule Take 0.4 mg by mouth nightly. Provider, Historical   hydroCHLOROthiazide (HYDRODIURIL) 25 mg tablet Take 25 mg by mouth daily. Provider, Historical   esomeprazole (NEXIUM) 20 mg capsule Take 20 mg by mouth daily. Provider, Historical   insulin NPH (NovoLIN N NPH U-100 Insulin) 100 unit/mL injection 50 Units by SubCUTAneous route two (2) times a day. 50 units q am, 50 q pm  Indications: type 2 diabetes mellitus    Provider, Historical   insulin regular (NovoLIN R Regular U-100 Insuln) 100 unit/mL injection by SubCUTAneous route Before breakfast, lunch, and dinner. 10-15 units/sliding scale  Indications: type 2 diabetes mellitus    Provider, Historical   liraglutide (VICTOZA) 0.6 mg/0.1 mL (18 mg/3 mL) pnij 1.2 mg by SubCUTAneous route every seven (7) days. Patient not taking: Reported on 6/24/2021    Provider, Historical   metoprolol succinate (TOPROL-XL) 25 mg XL tablet Take 25 mg by mouth daily. Provider, Historical   clopidogrel (PLAVIX) 75 mg tab Take 75 mg by mouth daily. Provider, Historical   furosemide (LASIX) 20 mg tablet Take 20 mg by mouth as needed. Provider, Historical   lovastatin (MEVACOR) 20 mg tablet TAKE ONE TABLET BY MOUTH NIGHTLY 4/28/14   Natalie Guan MD   metFORMIN (GLUCOPHAGE) 1,000 mg tablet Take 1,000 mg by mouth two (2) times daily (with meals). Provider, Historical   lisinopril (PRINIVIL, ZESTRIL) 10 mg tablet Take 1 Tab by mouth daily. Patient taking differently: Take 20 mg by mouth two (2) times a day. Takes 20 mg BID 7/27/12   Anne Marie Mcguire MD     No Known Allergies     Review of Systems:  A comprehensive review of systems was negative. Objective: Intake and Output:    No intake/output data recorded.   No intake/output data recorded. Physical Exam:   There were no vitals taken for this visit. General:  Alert, cooperative, no distress, appears stated age. Head:  Normocephalic, without obvious abnormality, atraumatic. Eyes:  Conjunctivae/corneas clear. PERRL,    Ears:  Normal  external ear canals both ears. Nose: Nares normal. Septum midline. Mucosa normal. No drainage or sinus tenderness. Throat: Lips, mucosa, and tongue normal. Teeth and gums normal.   Neck: Supple, symmetrical, trachea midline, no adenopathy, thyroid: no enlargement/tenderness/nodules,    Back:   Symmetric, no curvature. ROM normal.    Lungs:   Clear to auscultation bilaterally. Chest wall:  No tenderness or deformity. Heart:  Regular rate and rhythm, S1, S2 normal, no murmur, click, rub or gallop. Abdomen:   Soft, non-tender. Bowel sounds normal. No masses,  No organomegaly. Extremities: Extremities normal, atraumatic, no cyanosis or edema. Pulses: 1+ and symmetric all extremities. Skin: Skin color, texture, turgor normal. No rashes or lesions   Lymph nodes: Cervical, supraclavicular, and axillary nodes normal.   Neurologic: CNII-XII intact. Normal strength, and reflexes throughout. LOWER EXTREMITIES:   Palpable pedal pulses with good muscle strength  Diminished epicritic sensation  1st right toe has been amputated at the IPJj; the is a grade 2 granular wound that measures 1.5x1x.3cm; scant drainage with surrounding hyperkeratosis ; erythema of the toetoday    XRAY right foot:  1st right toe has been amputated at the proximal phalangeal head. there is a lytic hyperostosis distal plantar lateral of the proximal phalangeal head. Can not rule out osteomyelitis  Data Review:   No results found for this or any previous visit (from the past 24 hour(s)). Assessment:     Active Problems:    * No active hospital problems. *  chronic wound 1st right toe    Plan:   Scheduled for amputation 1st right toe.   Discussed  risks, benefits, expected outcome  As well as post op care

## 2021-06-28 NOTE — ANESTHESIA PREPROCEDURE EVALUATION
Relevant Problems   ENDOCRINE   (+) Type 2 diabetes mellitus with foot ulcer ([facility] [address]       Anesthetic History               Review of Systems / Medical History  Patient summary reviewed and pertinent labs reviewed    Pulmonary        Sleep apnea           Neuro/Psych   Within defined limits           Cardiovascular    Hypertension          CAD    Exercise tolerance: <4 METS     GI/Hepatic/Renal     GERD           Endo/Other    Diabetes    Morbid obesity    Comments: Pancreas non functioning Other Findings              Physical Exam    Airway  Mallampati: III  TM Distance: 4 - 6 cm  Neck ROM: decreased range of motion        Cardiovascular    Rhythm: regular  Rate: normal         Dental    Dentition: Caps/crowns     Pulmonary  Breath sounds clear to auscultation               Abdominal  GI exam deferred       Other Findings            Anesthetic Plan    ASA: 4  Anesthesia type: MAC            Anesthetic plan and risks discussed with: Patient

## 2021-06-28 NOTE — PERIOP NOTES
Handoff Report from Operating Room to PACU    Report received from Dr Bean Vargas and Crystal Walker regarding Giulia Garcia. Surgeon(s):  Carolina Gupta DPM  And Procedure(s) (LRB):  AMPUTATION FIRST RIGHT TOE (Right)  confirmed   with allergies discussed. Anesthesia type, drugs, patient history, complications, estimated blood loss, vital signs, intake and output, and lines and temperature were reviewed.

## 2021-06-29 NOTE — OP NOTES
[facility]  OPERATIVE REPORT    Name:  Alpa Villanueva  MR#:  [id number]  :  1965  ACCOUNT #:  [de-identified]  DATE OF SERVICE:  2021    PREOPERATIVE DIAGNOSIS:  Osteomyelitis, first right toe. POSTOPERATIVE DIAGNOSIS:  Osteomyelitis, first right toe. PROCEDURE PERFORMED:  Amputation, first right toe. SURGEON:  Carolina Galeana DPM    ASSISTANT: IV sedation with local    ANESTHESIA:  Local.    COMPLICATIONS:  none    SPECIMENS REMOVED:  Bone from the first right toe. IMPLANTS:  none. ESTIMATED BLOOD LOSS:  Minimal.    INDICATIONS:  This 50-year-old white male presents with an open wound first right toe of unknown duration. The patient states that a portion of the first toe was amputated more than 6 months ago, and a plantar wound under the toe has never healed. At this time, surgical intervention has been opted as the treatment of choice by the patient. Medical history and physical has been performed. The patient is released to surgery. Physical examination revealed palpable pedal pulses with fairly good muscle strength, lower extremities bilateral.  Previous and partial amputation, first right toe. Plantar first right toe, there is a granular wound that measures 1 x 1.5 x 0.3 cm. X-rays taken revealed distal phalanx and the head of the proximal phalanx has not been amputated, but the remaining portion of the proximal phalanx, there is an irregular hyperostosis distal plantar lateral aspect. Osteomyelitis cannot be ruled out. That portion of the bone appears lytic. DESCRIPTION OF PROCEDURE:  The patient was brought into the operating room, placed on the operating table in the supine position. Anesthesia was achieved to the right foot using 0.05% Marcaine plain. Right foot was now prepped and draped in the usual sterile manner and a successful time-out was completed. A pneumatic ankle tourniquet was inflated to 250 mmHg.     Next, using a #15 blade, a curvilinear incision was made distal aspect of the first remaining portion of the first right toe and the granular ulceration was also excised. There was no purulence. No necrotic tissue noted. The remaining portion of the proximal phalanx was removed. Aerobic as well as anaerobic wound cultures were taken. The wound was now flushed with copious amounts of saline and all skin edges were reapproximated using 2.0 nylon with simple interrupted sutures. Betadine-soaked Adaptic followed by a sterile compressive dressing was now placed on the right foot. Pneumatic ankle tourniquet was released and good color return was noted to all remaining digits, right foot. The patient was given 2 g Ancef IV preoperatively.       Benitez Riggs DPM      [id number]  D:  06/28/2021 12:59  T:  06/28/2021 22:58  JOB #:  [id number]

## 2021-06-30 LAB
BACTERIA SPEC CULT: NORMAL
SERVICE CMNT-IMP: NORMAL

## 2021-07-05 LAB
BACTERIA SPEC CULT: ABNORMAL
BACTERIA SPEC CULT: ABNORMAL
GRAM STN SPEC: ABNORMAL
GRAM STN SPEC: ABNORMAL
SERVICE CMNT-IMP: ABNORMAL

## 2021-07-16 ENCOUNTER — HOSPITAL ENCOUNTER (OUTPATIENT)
Dept: WOUND CARE | Age: 56
Discharge: HOME | End: 2021-07-16
Admitting: PODIATRIST
Payer: MEDICARE

## 2021-07-16 VITALS
RESPIRATION RATE: 16 BRPM | HEART RATE: 80 BPM | TEMPERATURE: 96.9 F | SYSTOLIC BLOOD PRESSURE: 143 MMHG | DIASTOLIC BLOOD PRESSURE: 69 MMHG

## 2021-07-16 PROCEDURE — 11042 DBRDMT SUBQ TIS 1ST 20SQCM/<: CPT

## 2021-07-16 NOTE — WOUND CARE
07/16/21 1521   Wound Foot Right;Plantar 05/21/21   Date First Assessed: 05/21/21   Wound Approximate Age at First Assessment (Weeks): 4 weeks  Primary Wound Type: Diabetic  Location: Foot  Wound Location Orientation: Right;Plantar  Date of First Observation: 05/21/21   Wound Image    Wound Etiology Diabetic   Dressing Status Old drainage noted   Cleansed Cleansed with saline   Dressing/Treatment   (Betadine wet to dry, G, RG, ace)   Wound Length (cm) 1.5 cm   Wound Width (cm) 1 cm   Wound Depth (cm) 0.1 cm   Wound Surface Area (cm^2) 1.5 cm^2   Change in Wound Size % -11.11   Wound Volume (cm^3) 0.15 cm^3   Wound Healing % 86   Post-Procedure Length (cm) 3 cm   Post-Procedure Width (cm) 1.6 cm   Post-Procedure Depth (cm) 0.3 cm   Post-Procedure Surface Area (cm^2) 4.8 cm^2   Post-Procedure Volume (cm^3) 1.44 cm^3   Wound Assessment Eschar dry;Pink/red   Drainage Amount Small   Drainage Description Serosanguinous   Wound Odor None   Magali-Wound/Incision Assessment Hyperkeratosis (Callous)   Edges   (Approximated with sutures)   Wound Thickness Description Full thickness   Pain 1   Pain Scale 1 Numeric (0 - 10)   Pain Intensity 1 0   Patient Stated Pain Goal 0     Visit Vitals  BP (!) 143/69 (BP 1 Location: Left upper arm, BP Patient Position: At rest;Sitting)   Pulse 80   Temp 96.9 °F (36.1 °C)   Resp 16

## 2021-07-16 NOTE — DISCHARGE INSTRUCTIONS
Discharge Instructions/Wound Care Orders  [address]  [facility], [address] [facility] Emma [address]  [address].    Telephone: [phone]     Wound Care Orders:  Right plantar foot wound - cleanse with saline, pack with betadine moistened gauze, cover with gauze, secure with roll gauze and tape on dressing only. Change dressing every other day. Follow-up in clinic in 2 weeks. Treatment Orders:   Elevate leg(s) above the level of the heart when sitting, if swelling present. Avoid prolonged standing in one place. Wear off-loading shoe/boot on the affected foot when walking. Do not get dressing/cast wet. Do not use objects to scratch inside cast/wrap. Follow diet as prescribed:  [x] Diet as tolerated: [x] Diabetic Diet: Low carb no sugar [x] No Added Salt:  [x] Increase Protein: [] Other:Limit the amount of liquid you are drinking and avoid drinking in between meals             Follow-up:  [x] Return Appointment: With DR Kendall Choudhary  in  [address] [place] [address])  [] Ordered tests:        Electronically signed Fidelina Anne RN on 7/16/2021 at 3:38 PM     [phone]: Should you experience any significant changes in your wound(s) or have questions about your wound care, please contact the [facility] Main at [address] [place] [address] 8:00 am - 4:30. If you need help with your wound outside these hours and cannot wait until we are again available, contact your PCP or go to the hospital emergency room. PLEASE NOTE: IF YOU ARE UNABLE TO OBTAIN WOUND SUPPLIES, CONTINUE TO USE THE SUPPLIES YOU HAVE AVAILABLE UNTIL YOU ARE ABLE TO REACH US. IT IS MOST IMPORTANT TO KEEP THE WOUND COVERED AT ALL TIMES.      Physician Signature:_______________________    Date: ___________ Time:  ____________

## 2021-07-16 NOTE — PROGRESS NOTES
[address]  WOUND CARE PROGRESS NOTE    Name:  Roberto Eli  MR#:  [id number]  :  1965  ACCOUNT #:  [de-identified]  DATE OF SERVICE:  2021    HISTORY OF CHIEF COMPLAINT:  This 50-year-old white male is postop amputation of first right toe. In three weeks, the patient dehisced the sutures from weightbearing and has been applying Betadine wet-to-dry dressings. He has also been taking doxycycline 100 mg twice a day. History and physical unchanged from admitting history and physical; no change in medications, no change in allergies. ALLERGIES:  NO KNOWN DRUG ALLERGIES. PAST MEDICAL HISTORY:  Diabetes with neuropathy, hypertension, and obesity. CURRENT MEDICATIONS:  Doxycycline 100 mg twice a day, Flomax 0.4 mg at night, hydrochlorothiazide 25 mg daily, Nexium 20 mg daily, Novolin N regular insulin 1000 units/mL on a sliding scale, Victoza 0.6 mg/mL 1.2 mg every seven days, Metoprolol Succinate XL 25 mg daily, Plavix 75 mg daily, Lasix 20 mg daily, metformin 1000 mg twice a day, lisinopril 20 mg two times a day. PHYSICAL EXAMINATION:  VITAL SIGNS:  Temperature 96.9, pulse rate 80, respirations 16, blood pressure 143/69. EXTREMITIES:  Physical examination of lower extremities revealed palpable pedal pulses and fairly good muscle strength in lower extremities bilaterally with diminished epicritic sensation. First right MPJ has sutures intact where the first toe was resected. There is some dehiscence and gapping of the sutures in most lateral aspect resulting in a granular wound that measures 3.2 x 0.8 x 0.2 cm. Wound does not appear to be infected. ASSESSMENT:  A dehisced surgical wound on right foot resulting from amputation of the first right toe. Treatment rendered consisted of removing sutures every other suture. Betadine wet-to-dry dressings was placed on the incision site. The patient is to continue Betadine wet-to-dry dressings every other day.   He will have followup visit in the Wound Clinic with myself, Dr. Isi Dejesus, in two weeks. Surgical pathology that was performed on 06/28/2021 indicated osteomyelitis of the proximal phalanx, first right toe, and intraoperative wound cultures showed Staph aureus sensitive to doxycycline.         Nanda Cartagena, HANG [id number]  D:  07/16/2021 15:53  T:  07/16/2021 19:18  JOB #:  [id number]

## 2021-07-30 ENCOUNTER — HOSPITAL ENCOUNTER (OUTPATIENT)
Dept: WOUND CARE | Age: 56
Discharge: HOME | End: 2021-07-30
Admitting: PODIATRIST
Payer: MEDICARE

## 2021-07-30 VITALS
SYSTOLIC BLOOD PRESSURE: 124 MMHG | TEMPERATURE: 97.3 F | HEART RATE: 81 BPM | DIASTOLIC BLOOD PRESSURE: 57 MMHG | RESPIRATION RATE: 18 BRPM

## 2021-07-30 PROBLEM — T81.31XA DEHISCENCE OF SURGICAL WOUND: Status: ACTIVE | Noted: 2021-07-30

## 2021-07-30 PROBLEM — M86.9 OSTEOMYELITIS OF GREAT TOE OF RIGHT FOOT (HCC): Status: ACTIVE | Noted: 2021-07-30

## 2021-07-30 PROCEDURE — 99213 OFFICE O/P EST LOW 20 MIN: CPT

## 2021-07-30 NOTE — WOUND CARE
07/30/21 1450   Right Leg Edema Point of Measurement   Leg circumference 42 cm   Ankle circumference 27 cm   RLE Peripheral Vascular    Capillary Refill Less than/equal to 3 seconds   Color Appropriate for race   Temperature Warm   Sensation Decreased   Pedal Pulse Palpable   Wound Foot Right;Plantar 05/21/21   Date First Assessed: 05/21/21   Wound Approximate Age at First Assessment (Weeks): 4 weeks  Primary Wound Type: Diabetic  Location: Foot  Wound Location Orientation: Right;Plantar  Date of First Observation: 05/21/21   Wound Image    Wound Etiology Diabetic   Dressing Status   (removed betadine gauze,roll gauze and tape)   Cleansed Cleansed with saline   Offloading for Diabetic Foot Ulcers Post-op shoe   Drainage Amount Small   Drainage Description Serosanguinous   Wound Odor None   Magali-Wound/Incision Assessment Hyperkeratosis (Callous)   Pain 1   Pain Scale 1 Numeric (0 - 10)   Pain Intensity 1 0   Patient Stated Pain Goal 0   Pain Reassessment 1 Yes     Visit Vitals  BP (!) 124/57   Pulse 81   Temp 97.3 °F (36.3 °C)   Resp 18

## 2021-07-30 NOTE — DISCHARGE INSTRUCTIONS
Discharge Instructions/Wound Care Orders  [address]  [facility], [address] [facility] [address]  [address].    Telephone: [phone]     Wound Care Orders:  Right plantar foot wound - cleanse with saline, pack with betadine moistened gauze, cover with gauze, secure with roll gauze and tape on dressing only. Change dressing every other day. Follow-up in clinic in 3 weeks. Treatment Orders:   Elevate leg(s) above the level of the heart when sitting, if swelling present. Avoid prolonged standing in one place. Wear off-loading shoe/boot on the affected foot when walking. Do not get dressing/cast wet. Do not use objects to scratch inside cast/wrap. Follow diet as prescribed:  [x] Diet as tolerated: [x] Diabetic Diet: Low carb no sugar [] No Added Salt:  [] Increase Protein: [] Other:Limit the amount of liquid you are drinking and avoid drinking in between meals             Follow-up:  [x] Return Appointment: With DR Jean-Claude Mckoen  in  3 Week(s)  [] Ordered tests:        Electronically signed Luiz Reza RN on 7/30/2021 at 3:05 PM     [address]: Should you experience any significant changes in your wound(s) or have questions about your wound care, please contact the [facility] Main at [address] [place] [address] 8:00 am - 4:30. If you need help with your wound outside these hours and cannot wait until we are again available, contact your PCP or go to the hospital emergency room. PLEASE NOTE: IF YOU ARE UNABLE TO OBTAIN WOUND SUPPLIES, CONTINUE TO USE THE SUPPLIES YOU HAVE AVAILABLE UNTIL YOU ARE ABLE TO REACH US. IT IS MOST IMPORTANT TO KEEP THE WOUND COVERED AT ALL TIMES.      Physician Signature:_______________________    Date: ___________ Time:  ____________

## 2021-07-30 NOTE — WOUND CARE
07/30/21 1506   Wound Foot Right;Plantar 05/21/21   Date First Assessed: 05/21/21   Wound Approximate Age at First Assessment (Weeks): 4 weeks  Primary Wound Type: Diabetic  Location: Foot  Wound Location Orientation: Right;Plantar  Date of First Observation: 05/21/21   Dressing Status New dressing applied   Dressing/Treatment Betadine swabs/Povidone Iodine;Moist to dry; Roll gauze;Tape/Soft cloth adhesive tape   Offloading for Diabetic Foot Ulcers Post-op shoe

## 2021-07-31 NOTE — PROGRESS NOTES
[address]  WOUND CARE PROGRESS NOTE    Name:  Corin Daniel  MR#:  [id number]  :  1965  ACCOUNT #:  [de-identified]  DATE OF SERVICE:  2021    HISTORY OF CHIEF COMPLAINT:  This 81-year-old white male presents for continued treatment and postop visit amputation of the first right toe secondary to osteomyelitis. Wound had dehisced and most recent wound care has consisted of Betadine wet-to-dry dressings daily performed by the patient's wife. History and physical unchanged from admitting history and physical; no change in medications, no change in allergies. ALLERGIES:  NO KNOWN DRUG ALLERGIES. PAST MEDICAL HISTORY:  Morbid obesity, diabetes with neuropathy. CURRENT MEDICATIONS:  Flomax 0.4 mg daily, hydrochlorothiazide 25 mg daily, Nexium 20 mg daily, Novolin NPH insulin 1000 units/mL 50 units three times a day, Novolin R insulin 100 units/mL on a sliding scale, Victoza 0.6 mg/0.1 mL, 1.2 mg every seven days, Metoprolol Succinate XL 25 mg daily, Plavix 75 mg daily, Lasix 20 mg daily, Mevacor 20 mg daily, metformin 1000 mg twice a day, lisinopril 10 mg twice a day. PHYSICAL EXAMINATION:  VITAL SIGNS:  Temperature 97.3, pulse rate 81, respirations 18, blood pressure 130/21. EXTREMITIES:  Physical examination of lower extremities revealed palpable pedal pulses and fairly good muscle strength in lower extremities bilaterally with diminished epicritic sensation. On previous amputation of first right toe, there are some retained sutures. The very lateral aspect of the incision has a transverse fissured opening that measures 2.0 x 0.1 x 0.1 cm. It is granular, does not appear to be infected. ASSESSMENT:  Dehisced surgical wound, right foot. Treatment rendered consisted of removal of all sutures. Wound was dressed with Betadine wet-to-dry dressings. The patient's wife is to continue every other day.   He will have followup visit in the [address] [place] [address] with myself,  Candy in three weeks. At this time, the patient can return to sneakers if they accommodate his dressings.       Libra Ly DPM      [id number]  D:  07/30/2021 15:39  T:  07/31/2021 1:14  JOB #:  [id number]

## 2021-08-20 ENCOUNTER — HOSPITAL ENCOUNTER (OUTPATIENT)
Dept: WOUND CARE | Age: 56
Discharge: HOME | End: 2021-08-20
Admitting: PODIATRIST
Payer: MEDICARE

## 2021-08-20 VITALS
RESPIRATION RATE: 18 BRPM | TEMPERATURE: 97.4 F | SYSTOLIC BLOOD PRESSURE: 163 MMHG | HEART RATE: 84 BPM | DIASTOLIC BLOOD PRESSURE: 76 MMHG

## 2021-08-20 PROCEDURE — 97597 DBRDMT OPN WND 1ST 20 CM/<: CPT

## 2021-08-20 RX ORDER — SEMAGLUTIDE 1.34 MG/ML
0.5 INJECTION, SOLUTION SUBCUTANEOUS
COMMUNITY

## 2021-08-20 NOTE — DISCHARGE INSTRUCTIONS
Discharge Instructions/Wound Orders  [place] [address]  [facility], [address] [facility] [place], [id number]  Telephone: [phone]    NAME:  Loreta Sanchez  YOB: 1965  MEDICAL RECORD NUMBER:  [id number]  DATE:  8/20/2021  Wound Care Orders:  Right plantar foot wound - cleanse with saline, place metahoney into wound, cover with gauze, secure with Foam border. Change dressing every other day. Follow-up in clinic in 3 weeks. Dietary:  [] Diet as tolerated: [] Calorie Diabetic Diet:Low carb and no Sugar [] No Added Salt:[] Increase Protein: [] Other:Limit the amount of liquid you are drinking and avoid drinking in between meals   Activity:  [] Activity as tolerated:  [] Patient has no activity restrictions     [] Strict Bedrest: [] Remain off Work:     [] May return to full duty work:                                   [] Return to work with restrictions:             Return Appointment:   [] Return Appointment: With DR Isaiah Rosario  in  3 Week(s)  [] Ordered tests:    Electronically signed Mayco Munoz on 8/20/2021 at 2:45 PM     [address]: Should you experience any significant changes in your wound(s) or have questions about your wound care, please contact the [facility] Main at [address] [place] [address] 8:00 am - 4:30. If you need help with your wound outside these hours and cannot wait until we are again available, contact your PCP or go to the hospital emergency room. PLEASE NOTE: IF YOU ARE UNABLE TO OBTAIN WOUND SUPPLIES, CONTINUE TO USE THE SUPPLIES YOU HAVE AVAILABLE UNTIL YOU ARE ABLE TO REACH US. IT IS MOST IMPORTANT TO KEEP THE WOUND COVERED AT ALL TIMES.      Physician Signature:_______________________    Date: ___________ Time:  ____________

## 2021-09-10 ENCOUNTER — HOSPITAL ENCOUNTER (OUTPATIENT)
Dept: WOUND CARE | Age: 56
Discharge: HOME | End: 2021-09-10
Payer: MEDICARE

## 2021-09-10 VITALS
HEART RATE: 82 BPM | TEMPERATURE: 97.1 F | DIASTOLIC BLOOD PRESSURE: 70 MMHG | RESPIRATION RATE: 18 BRPM | SYSTOLIC BLOOD PRESSURE: 152 MMHG

## 2021-09-10 PROCEDURE — 99212 OFFICE O/P EST SF 10 MIN: CPT

## 2021-09-10 NOTE — WOUND CARE
09/10/21 1513   Wound Toe (Comment  which one)  non healing surgical wound right great toe   Date First Assessed: 08/20/21   Location: Toe (Comment  which one)  Wound Location Orientation: (c)   Wound Description: non healing surgical wound right great toe   Wound Image    Wound Etiology Non-Healing Surgical   Dressing Status   (open to air)   Cleansed Cleansed with saline   Wound Length (cm) 0.1 cm   Wound Width (cm) 0.1 cm   Wound Depth (cm) 0.1 cm   Wound Surface Area (cm^2) 0.01 cm^2   Change in Wound Size % 93.33   Wound Volume (cm^3) 0.001 cm^3   Wound Healing % 97   Wound Assessment Epithelialization   Drainage Amount None   Wound Odor None   Magali-Wound/Incision Assessment Intact   Edges Attached edges   Wound Thickness Description Full thickness   Wound Foot Right;Plantar 05/21/21   Date First Assessed: 05/21/21   Wound Approximate Age at First Assessment (Weeks): 4 weeks  Primary Wound Type: Diabetic  Location: Foot  Wound Location Orientation: Right;Plantar  Date of First Observation: 05/21/21   Wound Image    Wound Etiology Diabetic   Dressing Status   (Open to air)   Cleansed Cleansed with saline   Dressing/Treatment   (Open to air)   Wound Length (cm) 0.1 cm   Wound Width (cm) 0.1 cm   Wound Depth (cm) 0.1 cm   Wound Surface Area (cm^2) 0.01 cm^2   Change in Wound Size % 99.26   Wound Volume (cm^3) 0.001 cm^3   Wound Healing % 100   Wound Assessment Epithelialization   Drainage Amount None   Wound Odor None   Magali-Wound/Incision Assessment Intact   Edges Attached edges   Wound Thickness Description Full thickness     Visit Vitals  BP (!) 152/70 (BP 1 Location: Left upper arm, BP Patient Position: At rest;Sitting)   Pulse (!) 2   Temp 97.1 °F (36.2 °C)   Resp 18

## 2021-09-10 NOTE — PROGRESS NOTES
[address]  WOUND CARE PROGRESS NOTE    Name:  Megan Perez  MR#:  [id number]  :  1965  ACCOUNT #:  [de-identified]  DATE OF SERVICE:  09/10/2021    HISTORY OF CHIEF COMPLAINT:  This 81-year-old white male presents for continued treatment of followup visits postop amputation first right toe, presents with regular shoes and wound appears to be healed. History and physical unchanged from admitting history and physical; no change in medicines, no change in allergies. PHYSICAL EXAMINATION:  VITAL SIGNS:  Temperature 97.1, respirations 18, blood pressure 152/70. EXTREMITIES:  Physical examination of lower extremities revealed palpable pedal pulses, fairly good muscle strength in lower extremities bilateral with diminished epicritic sensation. First right toe and second right toe both have been amputated. Both incisions are well-healed. ASSESSMENT:  Well-healed incision following amputation, first right toe on a diabetic with neuropathy. We discussed regular diabetic foot care. The patient is discharged from [address] [place] [address] today. I have advised that he follows up in my office with podiatrist of his choice every 2-3 months for regular diabetic foot care.         HANG Howell/[id number]  D:  09/10/2021 16:14  T:  09/10/2021 17:54  JOB #:  [id number]

## 2021-09-10 NOTE — DISCHARGE INSTRUCTIONS
Discharge Instructions/Wound Care Orders  Bedřicha Smetany [address] [place] [address] 1, 900 [facility] Emma Brown  72.    Telephone: [phone]     Wound Care Orders:  Right foot wounds - no further dressings required, wash with soap and water, rinse, pat dry, no further follow-up needed. Treatment Orders:   Elevate leg(s) above the level of the heart when sitting, if swelling present. Avoid prolonged standing in one place. Wear off-loading shoe/boot on the affected foot when walking. Do not get dressing/cast wet. Do not use objects to scratch inside cast/wrap. Follow diet as prescribed:  [x] Diet as tolerated: [x] Diabetic Diet: Low carb no sugar [x] No Added Salt:  [x] Increase Protein: [] Other:Limit the amount of liquid you are drinking and avoid drinking in between meals             Follow-up:  [] Return Appointment: No further follow-up needed. [] Ordered tests:        Electronically signed Latoya Graves RN on 9/10/2021 at 3:22 PM     Ingrid [address]: Should you experience any significant changes in your wound(s) or have questions about your wound care, please contact the [facility] at [address] [place] [address] 8:00 am - 4:30. If you need help with your wound outside these hours and cannot wait until we are again available, contact your PCP or go to the hospital emergency room. PLEASE NOTE: IF YOU ARE UNABLE TO OBTAIN WOUND SUPPLIES, CONTINUE TO USE THE SUPPLIES YOU HAVE AVAILABLE UNTIL YOU ARE ABLE TO REACH US. IT IS MOST IMPORTANT TO KEEP THE WOUND COVERED AT ALL TIMES.      Physician Signature:_______________________    Date: ___________ Time:  ____________

## 2022-03-18 PROBLEM — L97.512 RIGHT FOOT ULCER, WITH FAT LAYER EXPOSED (HCC): Status: ACTIVE | Noted: 2021-05-21

## 2022-03-18 PROBLEM — E11.40 DIABETIC NEUROPATHY (HCC): Status: ACTIVE | Noted: 2021-05-21

## 2022-03-19 PROBLEM — M86.9 OSTEOMYELITIS OF GREAT TOE OF RIGHT FOOT (HCC): Status: ACTIVE | Noted: 2021-07-30

## 2022-03-19 PROBLEM — E11.621 TYPE 2 DIABETES MELLITUS WITH FOOT ULCER (HCC): Status: ACTIVE | Noted: 2021-05-21

## 2022-03-19 PROBLEM — L97.509 TYPE 2 DIABETES MELLITUS WITH FOOT ULCER (HCC): Status: ACTIVE | Noted: 2021-05-21

## 2022-03-20 PROBLEM — T81.31XA DEHISCENCE OF SURGICAL WOUND: Status: ACTIVE | Noted: 2021-07-30

## 2023-01-24 LAB — HBA1C MFR BLD HPLC: 8.6 %

## 2023-01-30 ENCOUNTER — TELEPHONE (OUTPATIENT)
Dept: FAMILY MEDICINE CLINIC | Age: 58
End: 2023-01-30

## 2023-02-01 ENCOUNTER — OFFICE VISIT (OUTPATIENT)
Dept: FAMILY MEDICINE CLINIC | Age: 58
End: 2023-02-01
Payer: MEDICARE

## 2023-02-01 VITALS
SYSTOLIC BLOOD PRESSURE: 106 MMHG | RESPIRATION RATE: 20 BRPM | HEIGHT: 70 IN | HEART RATE: 74 BPM | TEMPERATURE: 98.3 F | OXYGEN SATURATION: 99 % | WEIGHT: 232.6 LBS | DIASTOLIC BLOOD PRESSURE: 69 MMHG | BODY MASS INDEX: 33.3 KG/M2

## 2023-02-01 DIAGNOSIS — E11.9 TYPE 2 DIABETES MELLITUS WITHOUT COMPLICATION, WITH LONG-TERM CURRENT USE OF INSULIN (HCC): ICD-10-CM

## 2023-02-01 DIAGNOSIS — I25.10 CORONARY ARTERY DISEASE INVOLVING NATIVE CORONARY ARTERY OF NATIVE HEART WITHOUT ANGINA PECTORIS: ICD-10-CM

## 2023-02-01 DIAGNOSIS — I48.91 ATRIAL FIBRILLATION, UNSPECIFIED TYPE (HCC): ICD-10-CM

## 2023-02-01 DIAGNOSIS — Z98.84 HISTORY OF GASTRIC BYPASS: ICD-10-CM

## 2023-02-01 DIAGNOSIS — H91.93 BILATERAL HEARING LOSS, UNSPECIFIED HEARING LOSS TYPE: ICD-10-CM

## 2023-02-01 DIAGNOSIS — Z00.00 ENCOUNTER FOR MEDICAL EXAMINATION TO ESTABLISH CARE: Primary | ICD-10-CM

## 2023-02-01 DIAGNOSIS — D50.8 OTHER IRON DEFICIENCY ANEMIA: ICD-10-CM

## 2023-02-01 DIAGNOSIS — E11.42 DIABETIC POLYNEUROPATHY ASSOCIATED WITH TYPE 2 DIABETES MELLITUS (HCC): ICD-10-CM

## 2023-02-01 DIAGNOSIS — E11.40 CHRONIC PAINFUL DIABETIC NEUROPATHY (HCC): ICD-10-CM

## 2023-02-01 DIAGNOSIS — Z89.411 HISTORY OF AMPUTATION OF RIGHT GREAT TOE (HCC): ICD-10-CM

## 2023-02-01 DIAGNOSIS — Z79.4 TYPE 2 DIABETES MELLITUS WITHOUT COMPLICATION, WITH LONG-TERM CURRENT USE OF INSULIN (HCC): ICD-10-CM

## 2023-02-01 PROCEDURE — 99204 OFFICE O/P NEW MOD 45 MIN: CPT | Performed by: NURSE PRACTITIONER

## 2023-02-01 PROCEDURE — G8432 DEP SCR NOT DOC, RNG: HCPCS | Performed by: NURSE PRACTITIONER

## 2023-02-01 PROCEDURE — G8427 DOCREV CUR MEDS BY ELIG CLIN: HCPCS | Performed by: NURSE PRACTITIONER

## 2023-02-01 PROCEDURE — 3017F COLORECTAL CA SCREEN DOC REV: CPT | Performed by: NURSE PRACTITIONER

## 2023-02-01 PROCEDURE — 3052F HG A1C>EQUAL 8.0%<EQUAL 9.0%: CPT | Performed by: NURSE PRACTITIONER

## 2023-02-01 PROCEDURE — G8417 CALC BMI ABV UP PARAM F/U: HCPCS | Performed by: NURSE PRACTITIONER

## 2023-02-01 PROCEDURE — 2022F DILAT RTA XM EVC RTNOPTHY: CPT | Performed by: NURSE PRACTITIONER

## 2023-02-01 RX ORDER — DILTIAZEM HYDROCHLORIDE 120 MG/1
120 CAPSULE, EXTENDED RELEASE ORAL
COMMUNITY
Start: 2023-01-10 | End: 2023-02-01

## 2023-02-01 RX ORDER — DILTIAZEM HYDROCHLORIDE 120 MG/1
120 CAPSULE, EXTENDED RELEASE ORAL DAILY
Qty: 90 CAPSULE | Refills: 3
Start: 2023-02-01

## 2023-02-01 RX ORDER — APIXABAN 5 MG/1
5 TABLET, FILM COATED ORAL 2 TIMES DAILY
COMMUNITY
Start: 2022-11-17

## 2023-02-01 RX ORDER — FUROSEMIDE 20 MG/1
20 TABLET ORAL AS NEEDED
Qty: 30 TABLET | Refills: 0
Start: 2023-02-01

## 2023-02-01 RX ORDER — ASPIRIN 81 MG/1
81 TABLET ORAL
COMMUNITY

## 2023-02-01 NOTE — PROGRESS NOTES
Chief Complaint   Patient presents with    Establish Care         HPI:  Laura Sanchez is a 65y.o. year old male who is a new patient and is here to establish care. He was previous followed by Dr Yuni Johnson, and Horacio Harrison      CAD/HTN  Followed by Dr Nitish Oliveros routinely  History of cardiac stent years ago, then had to have CABG in 2020 then developed Afib 2022, then had ablation last year. Adelia Alpers Dr Maurine Leatherwood, next appointment Wednesday. On eliquis and Cardizem and metoprolol       DM  Last HgbA1c 8.6 on 1/24/23 per chart review, apparently had labs done at bariatric surgery last week  Stopped metformin when he had GB  Still taking 8-10 units insulin Novolin N at night, says his blood sugars are almost 300 before taking the insulin and in AM blood sugars are 100s  Restarted insulin at [place], off all diabetic medicine for 10 months  Metformin caused GI stomach. Right foot great toe and second toe amputated (few years ago)    Blurry vision, knows he has cataract. Has consult 2/14/23  Neuropathy bilateral feet for the past 5-6 years. Lyrica worked for symptoms in the past but made him \"mad and mean\"  Cymbalta did not work and made moods unstable too  Pain management- Izabela Andrew, Treating for neuropathy in feet. .  Taking percocet PRN for the past 5 years. History of GB 2/8/22, lost >100lbs. Bariatric surgeon Dr Rose Peña Location  Saw last week and had labs. Had MITZI but since losing weight has not needed CPAP  Has not followed up with sleep medicine  Has insomnia, only sleeps 4 hours per night, had tried [place] in the past but did not work. Trazodone has helped in past, waiting to see if okay to take trazodone with his cardiac medicines from his cardiologist.       Alpha gal due to tick bite three years ago. Then if he ate red meat would have diarrhea instantly   Since GB, able to eat red meat again, but only eating occasionally and not having diarrhea. Watching his diet closely  Working out at gym regularly  Drinks plenty of water  No Etoh   denies Nicotine. Complains of bilateral hearing decrease, worsening over the past 6-12 months. The following sections were reviewed & updated as appropriate: PMH, PL, PSH, and SH. Health Maintenance: colonoscopy UTD 2 years ago. [address] [facility]     Patient Active Problem List   Diagnosis Code    Dizziness - light-headed     Hyperglycemia without ketosis R73.9    Neuropathy of lower extremity G57.90    Diabetic neuropathy (HCC) E11.40    Type 2 diabetes mellitus with foot ulcer (HCC) E11.621, L97.509    Coronary artery disease involving native coronary artery of native heart without angina pectoris I25.10    History of gastric bypass Z98.84          Prior to Admission medications    Medication Sig Start Date End Date Taking? Authorizing Provider   Eliquis 5 mg tablet Take 5 mg by mouth two (2) times a day. 11/17/22  Yes Provider, Historical   aspirin delayed-release 81 mg tablet 81 mg. Yes Provider, Historical   dilTIAZem ER (TIAZAC) 120 mg capsule Take 1 Capsule by mouth daily. 2/1/23  Yes Valerie Bauer NP   furosemide (LASIX) 20 mg tablet Take 1 Tablet by mouth as needed (swelling). 2/1/23  Yes Valerie Bauer NP   insulin NPH (NOVOLIN N, HUMULIN N) 100 unit/mL injection Taking 8-10 units at night. If blood sugar is >300 he will take 10 units. If blood sugar 250-300 he takes 4 units 2/1/23  Yes Valerie Bauer NP   metoprolol succinate (TOPROL-XL) 25 mg XL tablet Take 25 mg by mouth daily. Yes Provider, Historical   lovastatin (MEVACOR) 20 mg tablet TAKE ONE TABLET BY MOUTH NIGHTLY 4/28/14  Yes La Nena Guan MD   lisinopril (PRINIVIL, ZESTRIL) 10 mg tablet Take 1 Tab by mouth daily. Patient taking differently: Take 20 mg by mouth two (2) times a day.  Takes 20 mg BID 7/27/12  Yes Gaudencio Domínguez MD          No Known Allergies         ROS:  Gen: no fatigue, fever, chills  Eyes: no excessive tearing, itching, or discharge  Nose: no rhinorrhea, no sinus pain  Mouth: no oral lesions, no sore throat  Resp: no shortness of breath, no wheezing, no cough  CV: no chest pain, no paroxysmal nocturnal dyspnea  Abd: no nausea, no heartburn, no diarrhea, no constipation, no abdominal pain  Neuro: no headaches, no syncope or presyncopal episodes  Endo: no polyuria, no polydipsia  Heme: no lymphadenopathy, no easy bruising or bleeding      Objective:    Visit Vitals  /69 (BP 1 Location: Left upper arm, BP Patient Position: Sitting, BP Cuff Size: Large adult)   Pulse 74   Temp 98.3 °F (36.8 °C) (Temporal)   Resp 20   Ht 5' 10\" (1.778 m)   Wt 232 lb 9.6 oz (105.5 kg)   SpO2 99%   BMI 33.37 kg/m²     Gen: alert, oriented, no acute distress  Head: normocephalic, atraumatic  Ears: external auditory canals clear, TMs without erythema or effusion but scar tissue pres  Eyes: pupils equal round reactive to light, sclera clear, conjunctiva clear  Nose: normal turbinates, no rhinorrhea  Oral: moist mucus membranes, no oral lesions, no pharyngeal inflammation or exudate  Neck: supple, no lymphadenopathy  Resp: no increased work of breathing, lungs clear to ausculation bilaterally  CV: S1, S2 normal, no murmurs, rubs, or gallops. Abd: soft, not tender, not distended. No hepatosplenomegaly. Normal bowel sounds. Neuro: cranial nerves intact, normal strength and movement in all extremities, reflexes and sensation intact and symmetric. Skin: no lesion or rash        Assessment & Plan:  Differential diagnosis and treatment options reviewed with patient who is in agreement with treatment plan as outlined below. ICD-10-CM ICD-9-CM    1. Encounter for medical examination to establish care  Z00.00 V70.9       2.  Type 2 diabetes mellitus without complication, with long-term current use of insulin (HCC)  E11.9 250.00 insulin NPH (NOVOLIN N, HUMULIN N) 100 unit/mL injection    Z79.4 V58.67 3. Bilateral hearing loss, unspecified hearing loss type  H91.93 389.9 REFERRAL TO ENT-OTOLARYNGOLOGY      4. Diabetic polyneuropathy associated with type 2 diabetes mellitus (HCC)  E11.42 250.60      357.2       5. History of gastric bypass  Z98.84 V45.86       6. Coronary artery disease involving native coronary artery of native heart without angina pectoris  I25.10 414.01       7. Chronic painful diabetic neuropathy (HCC)  E11.40 250.60      357.2       8. History of amputation of right great toe ([facility]  Z89.411 V49.71       9. Atrial fibrillation, unspecified type ([facility]  I48.91 427.31       10. Other iron deficiency anemia  D50.8 280.8           DM- recent HgbA1c elevated but he notes he just restarted his insulin about a month ago. Suggest that he take the NPH twice per day, he notes his evening blood sugars are higher. May need to take 4-6 units in AM and 8-10 units in PM.  Recheck labs in three months. Will request all recent labs from bariatric surgery. BP at goal.  Continue to monitor feet daily for wounds. Taking Calcium 600 mg BID and Vitamin for GB   Continue to follow with pain management, cardiology, EP cardiology and podiatry and bariatric surgery. Discussed BMI and healthy weight. Encouraged patient to work to implement changes including diet high in raw fruits and vegetables, lean protein and good fats. Limit refined, processed carbohydrates and sugar. Encouraged regular exercise. Has history of anemia, note that he used to have iron infusion but notes that he does not want iron infusions any more. Cannot tolerate PO iron. Refer to ENT for hearing loss. Verbal and written instructions (see AVS) provided. Patient expresses understanding and agreement of diagnosis and treatment plan.

## 2023-02-01 NOTE — PROGRESS NOTES
Chief Complaint   Patient presents with    Establish Care     1. Have you been to the ER, urgent care clinic since your last visit? Hospitalized since your last visit? A fib in August    2. Have you seen or consulted any other health care providers outside of the [address] [place] [address] since your last visit? Include any pap smears or colon screening.   cardiology    Health Maintenance Due   Topic Date Due    Hepatitis C Screening  Never done    Depression Screen  Never done    COVID-19 Vaccine (1) Never done    Foot Exam Q1  Never done    Eye Exam Retinal or Dilated  Never done    Hepatitis B Vaccine (1 of 3 - Risk 3-dose series) Never done    Shingles Vaccine (1 of 2) Never done    Pneumococcal 0-64 years (2 - PPSV23 if available, else PCV20) 03/20/2019    GFR test (Diabetes, CKD 3-4, OR last GFR 15-59)  10/21/2020    Diabetic Alb to Cr ratio (uACR) test  01/30/2021    Lipid Screen  05/01/2021    Flu Vaccine (1) 08/01/2022

## 2023-02-02 PROBLEM — L97.512 RIGHT FOOT ULCER, WITH FAT LAYER EXPOSED (HCC): Status: RESOLVED | Noted: 2021-05-21 | Resolved: 2023-02-02

## 2023-02-02 PROBLEM — I25.10 CORONARY ARTERY DISEASE INVOLVING NATIVE CORONARY ARTERY OF NATIVE HEART WITHOUT ANGINA PECTORIS: Status: ACTIVE | Noted: 2023-02-02

## 2023-02-02 PROBLEM — Z98.84 HISTORY OF GASTRIC BYPASS: Status: ACTIVE | Noted: 2023-02-02

## 2023-02-02 PROBLEM — M86.9 OSTEOMYELITIS OF GREAT TOE OF RIGHT FOOT (HCC): Status: RESOLVED | Noted: 2021-07-30 | Resolved: 2023-02-02

## 2023-02-02 PROBLEM — I48.91 A-FIB (HCC): Status: ACTIVE | Noted: 2023-02-02

## 2023-02-02 PROBLEM — T81.31XA DEHISCENCE OF SURGICAL WOUND: Status: RESOLVED | Noted: 2021-07-30 | Resolved: 2023-02-02

## 2023-04-27 ENCOUNTER — OFFICE VISIT (OUTPATIENT)
Dept: FAMILY MEDICINE CLINIC | Age: 58
End: 2023-04-27

## 2023-04-27 VITALS
WEIGHT: 242.4 LBS | HEIGHT: 70 IN | OXYGEN SATURATION: 99 % | BODY MASS INDEX: 34.7 KG/M2 | RESPIRATION RATE: 18 BRPM | TEMPERATURE: 98.3 F | HEART RATE: 87 BPM | SYSTOLIC BLOOD PRESSURE: 149 MMHG | DIASTOLIC BLOOD PRESSURE: 78 MMHG

## 2023-04-27 DIAGNOSIS — E11.9 TYPE 2 DIABETES MELLITUS WITHOUT COMPLICATION, WITH LONG-TERM CURRENT USE OF INSULIN (HCC): Primary | ICD-10-CM

## 2023-04-27 DIAGNOSIS — Z13.29 SCREENING FOR THYROID DISORDER: ICD-10-CM

## 2023-04-27 DIAGNOSIS — Z11.59 ENCOUNTER FOR HEPATITIS C SCREENING TEST FOR LOW RISK PATIENT: ICD-10-CM

## 2023-04-27 DIAGNOSIS — I10 PRIMARY HYPERTENSION: ICD-10-CM

## 2023-04-27 DIAGNOSIS — Z79.4 TYPE 2 DIABETES MELLITUS WITHOUT COMPLICATION, WITH LONG-TERM CURRENT USE OF INSULIN (HCC): Primary | ICD-10-CM

## 2023-04-27 DIAGNOSIS — I48.91 ATRIAL FIBRILLATION, UNSPECIFIED TYPE (HCC): ICD-10-CM

## 2023-04-27 DIAGNOSIS — I25.10 CORONARY ARTERY DISEASE INVOLVING NATIVE CORONARY ARTERY OF NATIVE HEART WITHOUT ANGINA PECTORIS: ICD-10-CM

## 2023-04-27 DIAGNOSIS — E55.9 VITAMIN D DEFICIENCY: ICD-10-CM

## 2023-04-27 DIAGNOSIS — Z98.84 HISTORY OF GASTRIC BYPASS: ICD-10-CM

## 2023-04-27 DIAGNOSIS — Z12.5 SPECIAL SCREENING EXAMINATION FOR NEOPLASM OF PROSTATE: ICD-10-CM

## 2023-04-27 DIAGNOSIS — E53.8 B12 DEFICIENCY: ICD-10-CM

## 2023-04-27 DIAGNOSIS — D50.8 OTHER IRON DEFICIENCY ANEMIA: ICD-10-CM

## 2023-04-27 LAB
FOLATE SERPL-MCNC: 30.5 NG/ML (ref 5–21)
VIT B12 SERPL-MCNC: 882 PG/ML (ref 193–986)

## 2023-04-27 RX ORDER — PEN NEEDLE, DIABETIC 30 GX3/16"
NEEDLE, DISPOSABLE MISCELLANEOUS
Qty: 100 EACH | Refills: 3 | Status: SHIPPED | OUTPATIENT
Start: 2023-04-27

## 2023-04-27 RX ORDER — OXYCODONE AND ACETAMINOPHEN 10; 325 MG/1; MG/1
1-2 TABLET ORAL
COMMUNITY
Start: 2018-01-18

## 2023-04-27 RX ORDER — LISINOPRIL 20 MG/1
20 TABLET ORAL DAILY
Qty: 90 TABLET | Refills: 3 | Status: SHIPPED | OUTPATIENT
Start: 2023-04-27

## 2023-04-27 NOTE — PROGRESS NOTES
Chief Complaint   Patient presents with    Follow-up    Diabetes         S: Lizz Metzger is a 65 y.o. yo male who presents for DM follow up. Last DM check hemoglobin A1c 8.6 on 1/24/23  Restarted insulin in December. Taking NPH twice per day and mealtime insulin as needed. Usually taking twice per day but says if really high he may take a 3rd dose. Typically taking 12-20 units of NPH in 24 hour period. He has been dosing the NPH based on his blood sugar that AM,   Blood sugars:  Average over the past 30 days is 220. Lower numbers in the AM, 120-130s or lower in AM  He has some  readings and that makes him feel shaky. He admits that he usually does not eat breakfast, but will have lunch and dinner. He admits that he has a \"sweet tooth and weakness to peanut m&ms\"        Foot exam  Diet and Exercise- S/P gastric bypass 2/8/22    CAD/HTN  Followed by Dr Heike Petersen routinely  History of cardiac stent years ago, then had to have CABG in 2020 then developed Afib 2022, then had ablation last year. Irene Wilson Prior. On eliquis and and metoprolol   Cardiology took him off diltiazem couple months ago due to dizziness. Decreased metoprolol dose to 12.5 mg BID and lowered his lisinopril to 10 mg daily. Health Maintenance Reviewed    Denies cardiac complaints including chest pain or discomfort, elevated heart rate, or palpitations. Denies any headache, vision changes, numbness and tingling or weakness in her extremities. Denies respiratory complaints including SOB, difficulty or pain with breathing, wheezes, and cough. Feels well and ROS is otherwise negative. Past Medical History:   Diagnosis Date    A-fib ([facility] 2/2/2023    Adverse effect of anesthesia     general anesthesia and he didnt wake up for 5 days     Allergy to alpha-gal     CAD (coronary artery disease) 2019    triple bypass - at [facility]    Chronic pain     bilaterial foot pain    Diabetes ([facility]     type 2    Diabetic foot ulcer ([facility] 2012    Diabetic neuropathy (HCC)     GERD (gastroesophageal reflux disease)     Hypertension     Ill-defined condition     Diabetic peripheral neuropathy    Morbid obesity ([facility]     Osteomyelitis of great toe of right foot ([facility] 2021    Right foot ulcer, with fat layer exposed ([facility] 2021    Sleep apnea     untreated - patient states machine dries our his mouth and nares so he can't breath      Past Surgical History:   Procedure Laterality Date    COLONOSCOPY N/A 10/14/2020    COLONOSCOPY performed by Medina Lim MD at [facility] ENDOSCOPY    HX GASTRIC BYPASS      HX OTHER SURGICAL      two toes amputated from right foot, the great toe and next one    OK CORONARY ARTERY BYP W/VEIN & ARTERY GRAFT 3 VEIN  2018    CABG    OK UNLISTED PROCEDURE CARDIAC SURGERY  2008    stent x1     Social History     Socioeconomic History    Marital status:      Spouse name: Not on file    Number of children: Not on file    Years of education: Not on file    Highest education level: Not on file   Occupational History    Not on file   Tobacco Use    Smoking status: Former     Packs/day: 2.00     Years: 30.00     Pack years: 60.00     Types: Cigarettes     Quit date:      Years since quittin.3    Smokeless tobacco: Never   Substance and Sexual Activity    Alcohol use: No    Drug use: Yes     Frequency: 2.0 times per week     Types: Marijuana     Comment: Edibles and smoke it    Sexual activity: Not on file   Other Topics Concern     Service Not Asked    Blood Transfusions Not Asked    Caffeine Concern Not Asked    Occupational Exposure Not Asked    Hobby Hazards Not Asked    Sleep Concern Not Asked    Stress Concern Not Asked    Weight Concern Not Asked    Special Diet Not Asked    Back Care Not Asked    Exercise Not Asked    Bike Helmet Not Asked    Seat Belt Not Asked    Self-Exams Not Asked   Social History Narrative    Not on file     Social Determinants of Health Financial Resource Strain: Not on file   Food Insecurity: Not on file   Transportation Needs: Not on file   Physical Activity: Not on file   Stress: Not on file   Social Connections: Not on file   Intimate Partner Violence: Not on file   Housing Stability: Not on file     Current Outpatient Medications   Medication Sig Dispense Refill    oxyCODONE-acetaminophen (PERCOCET 10)  mg per tablet 1-2 Tablets. Eliquis 5 mg tablet Take 1 Tablet by mouth two (2) times a day. aspirin delayed-release 81 mg tablet 1 Tablet. dilTIAZem ER (TIAZAC) 120 mg capsule Take 1 Capsule by mouth daily. 90 Capsule 3    furosemide (LASIX) 20 mg tablet Take 1 Tablet by mouth as needed (swelling). 30 Tablet 0    insulin NPH (NOVOLIN N, HUMULIN N) 100 unit/mL injection Taking 8-10 units at night. If blood sugar is >300 he will take 10 units. If blood sugar 250-300 he takes 4 units 1 mL 0    metoprolol succinate (TOPROL-XL) 25 mg XL tablet Take 1 Tablet by mouth daily. lovastatin (MEVACOR) 20 mg tablet TAKE ONE TABLET BY MOUTH NIGHTLY 30 Tab 0    lisinopril (PRINIVIL, ZESTRIL) 10 mg tablet Take 1 Tab by mouth daily. (Patient taking differently: Take 2 Tablets by mouth two (2) times a day. Takes 20 mg BID) 30 Tab 1       Pt is taking all medications as prescribed without any side effects or difficulty. No Known Allergies      Agree with nurses note. O: Visit Vitals  /70 (BP 1 Location: Left upper arm, BP Patient Position: Sitting, BP Cuff Size: Large adult)   Temp 98.3 °F (36.8 °C) (Temporal)   Resp 18   Wt 242 lb 6.4 oz (110 kg)   SpO2 98%   BMI 34.78 kg/m²      PAIN: No complaints of pain today. GENERAL: Rajinder Dennison  is sitting in the chair in NAD.   EYE: PERRLA. EOMs intact. Sclera anicteric without injection. RESP: Unlabored without SOB. Speaking in full sentences. Breath sounds are symmetrical bilaterally. Clear to auscultation to all fields. No wheezes. No rales or rhonchi.     CV: normal rate.  Regular rhythm. S1, S2 audible. No murmur noted. No rubs, clicks or gallops noted. NEURO:  awake, alert and oriented to person, place, and time and event. Clear speech. Muscle strength is +5/5 x 4 extremities. Steady gait. HEME/LYMPH: peripheral pulses palpable 2+ x 4 extremities. No peripheral edema is noted. FEET: Intact no wounds or abrasions. Results for orders placed or performed in visit on 01/31/23   AMB EXT HGBA1C   Result Value Ref Range    Hemoglobin A1c, External 8.6 %         A/P:  Differential diagnosis and treatment options reviewed with patient who is in agreement with treatment plan as outlined below. ICD-10-CM ICD-9-CM    1. Type 2 diabetes mellitus without complication, with long-term current use of insulin ([facility])  E11.9 250.00 HEMOGLOBIN A1C WITH EAG    Z79.4 V58.67 LIPID PANEL      METABOLIC PANEL, COMPREHENSIVE      CBC WITH AUTOMATED DIFF      AMB POC URINE, MICROALBUMIN, SEMIQUANT (3 RESULTS)      insulin regular (NOVOLIN R, HUMULIN R) 100 unit/mL injection      insulin detemir U-100 (LEVEMIR FLEXTOUCH) 100 unit/mL (3 mL) inpn      Insulin Needles, Disposable, 31 gauge x 5/16\" ndle      lisinopriL (PRINIVIL, ZESTRIL) 20 mg tablet      2. History of gastric bypass  Z98.84 V45.86       3. Coronary artery disease involving native coronary artery of native heart without angina pectoris  I25.10 414.01 LIPID PANEL      METABOLIC PANEL, COMPREHENSIVE      lisinopriL (PRINIVIL, ZESTRIL) 20 mg tablet      4. Atrial fibrillation, unspecified type ([facility] [address]  I48.91 427.31       5. Other iron deficiency anemia  D50.8 280.8 CBC WITH AUTOMATED DIFF      IRON PROFILE      6. Encounter for hepatitis C screening test for low risk patient  Z11.59 V73.89 HEPATITIS C AB      7. Special screening examination for neoplasm of prostate  Z12.5 V76.44 PSA SCREENING (SCREENING)      8. Screening for thyroid disorder  Z13.29 V77.0 TSH 3RD GENERATION      9.  Vitamin D deficiency  E55.9 268.9 VITAMIN D, 25 HYDROXY      10. B12 deficiency  E53.8 266.2 VITAMIN B12 & FOLATE      11. Primary hypertension  I10 401.9 lisinopriL (PRINIVIL, ZESTRIL) 20 mg tablet          BP is a little high, increase lisinopril to 20 mg daily. DASH diet. BS are labile at home. Will change from NPH to levemir for 24 hour coverage and try to avoid low blood sugars. Start at 12 units per day and if high blood sugars persisting after 3-4 days, increase by 2-3 units every 3-4 days. Continue to use sliding scale Regular insulin for meal time. Discussed BMI and healthy weight. Encouraged patient to work to implement changes including diet high in raw fruits and vegetables, lean protein and good fats. Limit refined, processed carbohydrates and sugar. Encouraged regular exercise. Advised of frequent feet checks  Advised yearly eye exam  Reviewed warning signs of diabetic emergency, hypertension, stroke and heart attack         Verbal and written instructions (see AVS) provided. Patient expresses understanding and agreement of diagnosis and treatment plan.

## 2023-04-27 NOTE — PROGRESS NOTES
Chief Complaint   Patient presents with    Follow-up    Diabetes     1. Have you been to the ER, urgent care clinic since your last visit? Hospitalized since your last visit? No    2. Have you seen or consulted any other health care providers outside of the [address] [place] [address] since your last visit? Include any pap smears or colon screening.    Pain management, cardiology,      Health Maintenance Due   Topic Date Due    Hepatitis C Screening  Never done    COVID-19 Vaccine (1) Never done    Foot Exam Q1  Never done    Eye Exam Retinal or Dilated  Never done    Diabetic Alb to Cr ratio (uACR) test  Never done    Hepatitis B Vaccine (1 of 3 - Risk 3-dose series) Never done    Lipid Screen  07/26/2013    Shingles Vaccine (1 of 2) Never done    Low dose CT lung screening  Never done    Pneumococcal 0-64 years (2 - PPSV23 if available, else PCV20) 05/15/2018    GFR test (Diabetes, CKD 3-4, OR last GFR 15-59)  10/21/2020    Medicare Yearly Exam  Never done

## 2023-04-28 LAB
25(OH)D3 SERPL-MCNC: 24.8 NG/ML (ref 30–100)
ALBUMIN SERPL-MCNC: 3 G/DL (ref 3.5–5)
ALBUMIN/GLOB SERPL: 0.9 (ref 1.1–2.2)
ALP SERPL-CCNC: 57 U/L (ref 45–117)
ALT SERPL-CCNC: 20 U/L (ref 12–78)
ANION GAP SERPL CALC-SCNC: 3 MMOL/L (ref 5–15)
AST SERPL-CCNC: 16 U/L (ref 15–37)
BASOPHILS # BLD: 0.1 K/UL (ref 0–0.1)
BASOPHILS NFR BLD: 1 % (ref 0–1)
BILIRUB SERPL-MCNC: 0.2 MG/DL (ref 0.2–1)
BUN SERPL-MCNC: 17 MG/DL (ref 6–20)
BUN/CREAT SERPL: 14 (ref 12–20)
CALCIUM SERPL-MCNC: 8.5 MG/DL (ref 8.5–10.1)
CHLORIDE SERPL-SCNC: 106 MMOL/L (ref 97–108)
CHOLEST SERPL-MCNC: 130 MG/DL
CO2 SERPL-SCNC: 31 MMOL/L (ref 21–32)
CREAT SERPL-MCNC: 1.2 MG/DL (ref 0.7–1.3)
DIFFERENTIAL METHOD BLD: ABNORMAL
EOSINOPHIL # BLD: 0.1 K/UL (ref 0–0.4)
EOSINOPHIL NFR BLD: 1 % (ref 0–7)
ERYTHROCYTE [DISTWIDTH] IN BLOOD BY AUTOMATED COUNT: 13.2 % (ref 11.5–14.5)
EST. AVERAGE GLUCOSE BLD GHB EST-MCNC: 166 MG/DL
GLOBULIN SER CALC-MCNC: 3.2 G/DL (ref 2–4)
GLUCOSE SERPL-MCNC: 159 MG/DL (ref 65–100)
HBA1C MFR BLD: 7.4 % (ref 4–5.6)
HCT VFR BLD AUTO: 38.5 % (ref 36.6–50.3)
HCV AB SERPL QL IA: NONREACTIVE
HDLC SERPL-MCNC: 50 MG/DL
HDLC SERPL: 2.6 (ref 0–5)
HGB BLD-MCNC: 12.4 G/DL (ref 12.1–17)
IMM GRANULOCYTES # BLD AUTO: 0.1 K/UL (ref 0–0.04)
IMM GRANULOCYTES NFR BLD AUTO: 0 % (ref 0–0.5)
IRON SATN MFR SERPL: 31 % (ref 20–50)
IRON SERPL-MCNC: 65 UG/DL (ref 35–150)
LDLC SERPL CALC-MCNC: 61.8 MG/DL (ref 0–100)
LYMPHOCYTES # BLD: 3.4 K/UL (ref 0.8–3.5)
LYMPHOCYTES NFR BLD: 29 % (ref 12–49)
MCH RBC QN AUTO: 29.4 PG (ref 26–34)
MCHC RBC AUTO-ENTMCNC: 32.2 G/DL (ref 30–36.5)
MCV RBC AUTO: 91.2 FL (ref 80–99)
MONOCYTES # BLD: 0.7 K/UL (ref 0–1)
MONOCYTES NFR BLD: 6 % (ref 5–13)
NEUTS SEG # BLD: 7.4 K/UL (ref 1.8–8)
NEUTS SEG NFR BLD: 63 % (ref 32–75)
NRBC # BLD: 0 K/UL (ref 0–0.01)
NRBC BLD-RTO: 0 PER 100 WBC
PLATELET # BLD AUTO: 555 K/UL (ref 150–400)
PMV BLD AUTO: 9.5 FL (ref 8.9–12.9)
POTASSIUM SERPL-SCNC: 4.9 MMOL/L (ref 3.5–5.1)
PROT SERPL-MCNC: 6.2 G/DL (ref 6.4–8.2)
PSA SERPL-MCNC: 0.5 NG/ML (ref 0.01–4)
RBC # BLD AUTO: 4.22 M/UL (ref 4.1–5.7)
SODIUM SERPL-SCNC: 140 MMOL/L (ref 136–145)
TIBC SERPL-MCNC: 209 UG/DL (ref 250–450)
TRIGL SERPL-MCNC: 91 MG/DL (ref ?–150)
TSH SERPL DL<=0.05 MIU/L-ACNC: 1.82 UIU/ML (ref 0.36–3.74)
VLDLC SERPL CALC-MCNC: 18.2 MG/DL
WBC # BLD AUTO: 11.7 K/UL (ref 4.1–11.1)

## 2023-11-09 ENCOUNTER — HOSPITAL ENCOUNTER (EMERGENCY)
Facility: HOSPITAL | Age: 58
Discharge: HOME | End: 2023-11-09
Attending: STUDENT IN AN ORGANIZED HEALTH CARE EDUCATION/TRAINING PROGRAM
Payer: MEDICARE

## 2023-11-09 VITALS
RESPIRATION RATE: 18 BRPM | HEIGHT: 70 IN | SYSTOLIC BLOOD PRESSURE: 103 MMHG | DIASTOLIC BLOOD PRESSURE: 64 MMHG | HEART RATE: 78 BPM | BODY MASS INDEX: 32.67 KG/M2 | OXYGEN SATURATION: 98 % | WEIGHT: 228.18 LBS | TEMPERATURE: 98.1 F

## 2023-11-09 DIAGNOSIS — L97.429 DIABETIC ULCER OF LEFT MIDFOOT ASSOCIATED WITH TYPE 1 DIABETES MELLITUS, UNSPECIFIED ULCER STAGE (HCC): Primary | ICD-10-CM

## 2023-11-09 DIAGNOSIS — E10.621 DIABETIC ULCER OF LEFT MIDFOOT ASSOCIATED WITH TYPE 1 DIABETES MELLITUS, UNSPECIFIED ULCER STAGE (HCC): Primary | ICD-10-CM

## 2023-11-09 PROCEDURE — 99282 EMERGENCY DEPT VISIT SF MDM: CPT

## 2023-11-09 ASSESSMENT — PAIN SCALES - GENERAL: PAINLEVEL_OUTOF10: 6

## 2023-11-09 ASSESSMENT — PAIN - FUNCTIONAL ASSESSMENT: PAIN_FUNCTIONAL_ASSESSMENT: 0-10

## 2023-11-09 NOTE — ED NOTES
DC papers reviewed and in hand, pt verbalized understanding. Patient ambulatory out of ED with steady gait, no acute distress noted.           Lee Allen RN  11/09/23 [id number]

## 2023-11-09 NOTE — DISCHARGE INSTRUCTIONS
You are seen in the emergency department for a foot ulcer, make sure to keep your follow-up appointment with your foot and ankle specialist.  Return to the emergency department if you develop increased pain, fever, or drainage from the area.

## 2023-11-17 NOTE — ED PROVIDER NOTES
soon as possible for a visit            DISCHARGE MEDICATIONS:     Medication List        ASK your doctor about these medications      aspirin 81 MG EC tablet     dilTIAZem 120 MG extended release capsule  Commonly known as: TIAZAC     Eliquis 5 MG Tabs tablet  Generic drug: apixaban     furosemide 20 MG tablet  Commonly known as: LASIX     insulin  UNIT/ML injection vial  Commonly known as: HumuLIN N;NovoLIN N     lisinopril 10 MG tablet  Commonly known as: PRINIVIL;ZESTRIL     lovastatin 20 MG tablet  Commonly known as: MEVACOR     metoprolol succinate 25 MG extended release tablet  Commonly known as: TOPROL XL                DISCONTINUED MEDICATIONS:  Discharge Medication List as of 11/9/2023 11:38 AM          I am the Primary Clinician of Record. Lazaro Tolbert MD (electronically signed)      (Please note that parts of this dictation were completed with voice recognition software. Quite often unanticipated grammatical, syntax, homophones, and other interpretive errors are inadvertently transcribed by the computer software. Please disregards these errors.  Please excuse any errors that have escaped final proofreading.)         Lazaro Tolbert MD  11/16/23 1579

## 2024-03-19 ENCOUNTER — HOSPITAL ENCOUNTER (OUTPATIENT)
Facility: HOSPITAL | Age: 59
Discharge: HOME | End: 2024-03-22
Payer: MEDICARE

## 2024-03-19 DIAGNOSIS — E11.9 DIABETES MELLITUS WITH NO COMPLICATION (HCC): ICD-10-CM

## 2024-03-19 DIAGNOSIS — Z91.018 ALLERGY TO ALPHA-GAL: ICD-10-CM

## 2024-03-19 DIAGNOSIS — K86.81 EXOCRINE PANCREATIC INSUFFICIENCY: ICD-10-CM

## 2024-03-19 LAB — CREAT BLD-MCNC: 1.4 MG/DL (ref 0.6–1.3)

## 2024-03-19 PROCEDURE — 6360000004 HC RX CONTRAST MEDICATION

## 2024-03-19 PROCEDURE — 74170 CT ABD WO CNTRST FLWD CNTRST: CPT

## 2024-03-19 PROCEDURE — 82565 ASSAY OF CREATININE: CPT

## 2024-03-19 RX ADMIN — IOPAMIDOL 100 ML: 755 INJECTION, SOLUTION INTRAVENOUS at 17:03

## 2024-04-16 LAB — HBA1C MFR BLD HPLC: 7.3 %

## 2024-05-23 ENCOUNTER — APPOINTMENT (OUTPATIENT)
Facility: HOSPITAL | Age: 59
End: 2024-05-23
Payer: MEDICARE

## 2024-05-23 ENCOUNTER — HOSPITAL ENCOUNTER (EMERGENCY)
Facility: HOSPITAL | Age: 59
Discharge: HOME | End: 2024-05-23
Attending: EMERGENCY MEDICINE
Payer: MEDICARE

## 2024-05-23 VITALS
HEIGHT: 70 IN | TEMPERATURE: 97.8 F | RESPIRATION RATE: 17 BRPM | OXYGEN SATURATION: 94 % | DIASTOLIC BLOOD PRESSURE: 83 MMHG | BODY MASS INDEX: 33.36 KG/M2 | HEART RATE: 58 BPM | SYSTOLIC BLOOD PRESSURE: 182 MMHG | WEIGHT: 233.03 LBS

## 2024-05-23 DIAGNOSIS — R03.0 ELEVATED BLOOD PRESSURE READING: Primary | ICD-10-CM

## 2024-05-23 DIAGNOSIS — T50.905A DRUG-INDUCED HYPERKALEMIA: ICD-10-CM

## 2024-05-23 DIAGNOSIS — E87.5 DRUG-INDUCED HYPERKALEMIA: ICD-10-CM

## 2024-05-23 LAB
ALBUMIN SERPL-MCNC: 3.2 G/DL (ref 3.5–5)
ALBUMIN/GLOB SERPL: 1.1 (ref 1.1–2.2)
ALP SERPL-CCNC: 61 U/L (ref 45–117)
ALT SERPL-CCNC: 16 U/L (ref 12–78)
ANION GAP SERPL CALC-SCNC: 0 MMOL/L (ref 5–15)
AST SERPL-CCNC: 15 U/L (ref 15–37)
BASOPHILS # BLD: 0 K/UL (ref 0–0.1)
BASOPHILS NFR BLD: 1 % (ref 0–1)
BILIRUB SERPL-MCNC: 0.6 MG/DL (ref 0.2–1)
BUN SERPL-MCNC: 23 MG/DL (ref 6–20)
BUN/CREAT SERPL: 23 (ref 12–20)
CALCIUM SERPL-MCNC: 9 MG/DL (ref 8.5–10.1)
CHLORIDE SERPL-SCNC: 107 MMOL/L (ref 97–108)
CO2 SERPL-SCNC: 33 MMOL/L (ref 21–32)
CREAT SERPL-MCNC: 0.99 MG/DL (ref 0.7–1.3)
DIFFERENTIAL METHOD BLD: ABNORMAL
EKG ATRIAL RATE: 58 BPM
EKG DIAGNOSIS: NORMAL
EKG P AXIS: 71 DEGREES
EKG P-R INTERVAL: 180 MS
EKG Q-T INTERVAL: 434 MS
EKG QRS DURATION: 142 MS
EKG QTC CALCULATION (BAZETT): 426 MS
EKG R AXIS: -30 DEGREES
EKG T AXIS: 28 DEGREES
EKG VENTRICULAR RATE: 58 BPM
EOSINOPHIL # BLD: 0.1 K/UL (ref 0–0.4)
EOSINOPHIL NFR BLD: 1 % (ref 0–7)
ERYTHROCYTE [DISTWIDTH] IN BLOOD BY AUTOMATED COUNT: 13.5 % (ref 11.5–14.5)
GLOBULIN SER CALC-MCNC: 3 G/DL (ref 2–4)
GLUCOSE SERPL-MCNC: 182 MG/DL (ref 65–100)
HCT VFR BLD AUTO: 35.7 % (ref 36.6–50.3)
HGB BLD-MCNC: 11.7 G/DL (ref 12.1–17)
IMM GRANULOCYTES # BLD AUTO: 0 K/UL (ref 0–0.04)
IMM GRANULOCYTES NFR BLD AUTO: 0 % (ref 0–0.5)
LYMPHOCYTES # BLD: 2.3 K/UL (ref 0.8–3.5)
LYMPHOCYTES NFR BLD: 31 % (ref 12–49)
MAGNESIUM SERPL-MCNC: 2.3 MG/DL (ref 1.6–2.4)
MCH RBC QN AUTO: 30.2 PG (ref 26–34)
MCHC RBC AUTO-ENTMCNC: 32.8 G/DL (ref 30–36.5)
MCV RBC AUTO: 92 FL (ref 80–99)
MONOCYTES # BLD: 0.5 K/UL (ref 0–1)
MONOCYTES NFR BLD: 7 % (ref 5–13)
NEUTS SEG # BLD: 4.5 K/UL (ref 1.8–8)
NEUTS SEG NFR BLD: 60 % (ref 32–75)
NRBC # BLD: 0 K/UL (ref 0–0.01)
NRBC BLD-RTO: 0 PER 100 WBC
NT PRO BNP: 3365 PG/ML
PLATELET # BLD AUTO: 318 K/UL (ref 150–400)
PMV BLD AUTO: 9.9 FL (ref 8.9–12.9)
POTASSIUM SERPL-SCNC: 5.3 MMOL/L (ref 3.5–5.1)
PROT SERPL-MCNC: 6.2 G/DL (ref 6.4–8.2)
RBC # BLD AUTO: 3.88 M/UL (ref 4.1–5.7)
SODIUM SERPL-SCNC: 140 MMOL/L (ref 136–145)
TROPONIN I SERPL HS-MCNC: 23 NG/L (ref 0–76)
WBC # BLD AUTO: 7.4 K/UL (ref 4.1–11.1)

## 2024-05-23 PROCEDURE — 85025 COMPLETE CBC W/AUTO DIFF WBC: CPT

## 2024-05-23 PROCEDURE — 83880 ASSAY OF NATRIURETIC PEPTIDE: CPT

## 2024-05-23 PROCEDURE — 36415 COLL VENOUS BLD VENIPUNCTURE: CPT

## 2024-05-23 PROCEDURE — 71045 X-RAY EXAM CHEST 1 VIEW: CPT

## 2024-05-23 PROCEDURE — 83735 ASSAY OF MAGNESIUM: CPT

## 2024-05-23 PROCEDURE — 84484 ASSAY OF TROPONIN QUANT: CPT

## 2024-05-23 PROCEDURE — 80053 COMPREHEN METABOLIC PANEL: CPT

## 2024-05-23 PROCEDURE — 99285 EMERGENCY DEPT VISIT HI MDM: CPT

## 2024-05-23 RX ORDER — FUROSEMIDE 10 MG/ML
40 INJECTION INTRAMUSCULAR; INTRAVENOUS ONCE
Status: DISCONTINUED | OUTPATIENT
Start: 2024-05-23 | End: 2024-05-23

## 2024-05-23 ASSESSMENT — PAIN SCALES - GENERAL: PAINLEVEL_OUTOF10: 10

## 2024-05-23 NOTE — ED PROVIDER NOTES
[facility] EMERGENCY DEPT  EMERGENCY DEPARTMENT ENCOUNTER       Pt Name: Chilo Parry  MRN: [id number]  Birthdate 1965  Date of evaluation: 5/23/2024  Provider: Javad Navarro MD   PCP: None, None  Note Started: 9:44 AM EDT 5/23/24     CHIEF COMPLAINT       Chief Complaint   Patient presents with    Shortness of Breath     Ambulatory to ED stating he was advised to report to ED after his PCP found BP to be in 230s systolic. Pt also reports worsening DIAS since this morning. States has missed several doses of Lasix and lisinopril.        HISTORY OF PRESENT ILLNESS: 1 or more elements      History From: patient, History limited by: none     Chilo Parry is a 59 y.o. male with a history of type 2 diabetes, right foot ulcer, neuropathy, CAD and prior gastric bypass presents emergency department as a referral from primary care.    Patient reports he had routine labs performed, was called 2 days ago due to \"high potassium.\"  Was advised to stop his lisinopril.  Had a follow-up appointment today, there was blood pressure was noted to be elevated in the 230s systolic.  Patient denies any complaints.  Drove himself to the emergency department from PCP office.    Patient reports some shortness of breath en route with walking into the emergency department.  Denies any chest pain.  Denies abdominal pain, vomiting or diarrhea.  Does report lower extremity edema.  Has not taken his home Lasix.       Please See MDM for Additional Details of the HPI/PMH  Nursing Notes were all reviewed and agreed with or any disagreements were addressed in the HPI.     REVIEW OF SYSTEMS        Positives and Pertinent negatives as per HPI.    PAST HISTORY     Past Medical History:  Past Medical History:   Diagnosis Date    A-fib (H[facility]) 2/2/2023    Adverse effect of anesthesia     general anesthesia and he didnt wake up for 5 days     Allergy to alpha-gal     CAD (coronary artery disease) 2019    triple bypass - at [facility]

## 2024-05-23 NOTE — ED NOTES
Pt discharged by MD Navarro. Pt verbalized understanding of follow up appointments, Rx instructions, and results of care. No further questions or concerns at this time. Pt out of ED ambulatory accompanied by self.

## 2024-05-23 NOTE — DISCHARGE INSTRUCTIONS
You were seen in the emergency department.  Your potassium was mildly elevated.  Please continue to not take your lisinopril and continue to take your Lasix.  Please follow-up with your primary care doctor for repeat labs and to discuss your blood pressure.

## 2024-06-19 ENCOUNTER — HOSPITAL ENCOUNTER (OUTPATIENT)
Facility: HOSPITAL | Age: 59
Discharge: HOME | End: 2024-06-19
Attending: ORTHOPAEDIC SURGERY
Payer: MEDICARE

## 2024-06-19 VITALS
RESPIRATION RATE: 18 BRPM | DIASTOLIC BLOOD PRESSURE: 81 MMHG | TEMPERATURE: 98.5 F | HEART RATE: 66 BPM | SYSTOLIC BLOOD PRESSURE: 182 MMHG

## 2024-06-19 DIAGNOSIS — L97.509 TYPE 2 DIABETES MELLITUS WITH FOOT ULCER, UNSPECIFIED WHETHER LONG TERM INSULIN USE (HCC): ICD-10-CM

## 2024-06-19 DIAGNOSIS — E11.621 TYPE 2 DIABETES MELLITUS WITH FOOT ULCER, UNSPECIFIED WHETHER LONG TERM INSULIN USE (HCC): ICD-10-CM

## 2024-06-19 DIAGNOSIS — E11.610 CHARCOT FOOT DUE TO DIABETES MELLITUS (HCC): Primary | ICD-10-CM

## 2024-06-19 PROCEDURE — 29445 APPL RIGID TOT CNTC LEG CAST: CPT

## 2024-06-19 RX ORDER — BACITRACIN ZINC AND POLYMYXIN B SULFATE 500; 1000 [USP'U]/G; [USP'U]/G
OINTMENT TOPICAL ONCE
OUTPATIENT
Start: 2024-06-19 | End: 2024-06-19

## 2024-06-19 RX ORDER — CLOBETASOL PROPIONATE 0.5 MG/G
OINTMENT TOPICAL ONCE
Status: CANCELLED | OUTPATIENT
Start: 2024-06-19 | End: 2024-06-19

## 2024-06-19 RX ORDER — LIDOCAINE 40 MG/G
CREAM TOPICAL ONCE
Status: CANCELLED | OUTPATIENT
Start: 2024-06-19 | End: 2024-06-19

## 2024-06-19 RX ORDER — LIDOCAINE HYDROCHLORIDE 40 MG/ML
SOLUTION TOPICAL ONCE
Status: CANCELLED | OUTPATIENT
Start: 2024-06-19 | End: 2024-06-19

## 2024-06-19 RX ORDER — IBUPROFEN 200 MG
TABLET ORAL ONCE
Status: CANCELLED | OUTPATIENT
Start: 2024-06-19 | End: 2024-06-19

## 2024-06-19 RX ORDER — LIDOCAINE 50 MG/G
OINTMENT TOPICAL ONCE
OUTPATIENT
Start: 2024-06-19 | End: 2024-06-19

## 2024-06-19 RX ORDER — LIDOCAINE HYDROCHLORIDE 20 MG/ML
JELLY TOPICAL ONCE
OUTPATIENT
Start: 2024-06-19 | End: 2024-06-19

## 2024-06-19 RX ORDER — LIDOCAINE HYDROCHLORIDE 20 MG/ML
JELLY TOPICAL ONCE
Status: CANCELLED | OUTPATIENT
Start: 2024-06-19 | End: 2024-06-19

## 2024-06-19 RX ORDER — BETAMETHASONE DIPROPIONATE 0.5 MG/G
CREAM TOPICAL ONCE
Status: CANCELLED | OUTPATIENT
Start: 2024-06-19 | End: 2024-06-19

## 2024-06-19 RX ORDER — TRIAMCINOLONE ACETONIDE 1 MG/G
OINTMENT TOPICAL ONCE
OUTPATIENT
Start: 2024-06-19 | End: 2024-06-19

## 2024-06-19 RX ORDER — LIDOCAINE 40 MG/G
CREAM TOPICAL ONCE
OUTPATIENT
Start: 2024-06-19 | End: 2024-06-19

## 2024-06-19 RX ORDER — GENTAMICIN SULFATE 1 MG/G
OINTMENT TOPICAL ONCE
OUTPATIENT
Start: 2024-06-19 | End: 2024-06-19

## 2024-06-19 RX ORDER — CLOBETASOL PROPIONATE 0.5 MG/G
OINTMENT TOPICAL ONCE
OUTPATIENT
Start: 2024-06-19 | End: 2024-06-19

## 2024-06-19 RX ORDER — TRIAMCINOLONE ACETONIDE 1 MG/G
OINTMENT TOPICAL ONCE
Status: CANCELLED | OUTPATIENT
Start: 2024-06-19 | End: 2024-06-19

## 2024-06-19 RX ORDER — BACITRACIN ZINC AND POLYMYXIN B SULFATE 500; 1000 [USP'U]/G; [USP'U]/G
OINTMENT TOPICAL ONCE
Status: CANCELLED | OUTPATIENT
Start: 2024-06-19 | End: 2024-06-19

## 2024-06-19 RX ORDER — TAMSULOSIN HYDROCHLORIDE 0.4 MG/1
0.4 CAPSULE ORAL 2 TIMES DAILY
COMMUNITY

## 2024-06-19 RX ORDER — BETAMETHASONE DIPROPIONATE 0.5 MG/G
CREAM TOPICAL ONCE
OUTPATIENT
Start: 2024-06-19 | End: 2024-06-19

## 2024-06-19 RX ORDER — IBUPROFEN 200 MG
TABLET ORAL ONCE
OUTPATIENT
Start: 2024-06-19 | End: 2024-06-19

## 2024-06-19 RX ORDER — GINSENG 100 MG
CAPSULE ORAL ONCE
Status: CANCELLED | OUTPATIENT
Start: 2024-06-19 | End: 2024-06-19

## 2024-06-19 RX ORDER — SODIUM CHLOR/HYPOCHLOROUS ACID 0.033 %
SOLUTION, IRRIGATION IRRIGATION ONCE
Status: CANCELLED | OUTPATIENT
Start: 2024-06-19 | End: 2024-06-19

## 2024-06-19 RX ORDER — LIDOCAINE HYDROCHLORIDE 40 MG/ML
SOLUTION TOPICAL ONCE
OUTPATIENT
Start: 2024-06-19 | End: 2024-06-19

## 2024-06-19 RX ORDER — OXYCODONE HYDROCHLORIDE 15 MG/1
15 TABLET ORAL EVERY 6 HOURS PRN
COMMUNITY

## 2024-06-19 RX ORDER — LIDOCAINE 50 MG/G
OINTMENT TOPICAL ONCE
Status: CANCELLED | OUTPATIENT
Start: 2024-06-19 | End: 2024-06-19

## 2024-06-19 RX ORDER — GENTAMICIN SULFATE 1 MG/G
OINTMENT TOPICAL ONCE
Status: CANCELLED | OUTPATIENT
Start: 2024-06-19 | End: 2024-06-19

## 2024-06-19 RX ORDER — GINSENG 100 MG
CAPSULE ORAL ONCE
OUTPATIENT
Start: 2024-06-19 | End: 2024-06-19

## 2024-06-19 RX ORDER — SODIUM CHLOR/HYPOCHLOROUS ACID 0.033 %
SOLUTION, IRRIGATION IRRIGATION ONCE
OUTPATIENT
Start: 2024-06-19 | End: 2024-06-19

## 2024-06-19 ASSESSMENT — PAIN SCALES - GENERAL: PAINLEVEL_OUTOF10: 4

## 2024-06-19 NOTE — H&P
Wound Center  History and Physical    Date of Service: 6/19/24     Date of Initial Visit for Current Problem:  6/19/24    Subjective:     Chief Complaint:  Chilo Parry is a 59 y.o.  male who presents with Rt. foot plantar wound of 8 months duration.     Referred by:  Dr. Knight    HPI:   He has had diabetes for about 10 years, and apparently has had some issues with glucose control in the past. Last A1c was about 7.5.  He notes that his other foot has started to have a deformity similar to the right foot.  Wound caused by: chronic pressure/irritation due to Charcot foot.  Current wound care:  Offloading wound: no  Appetite: good  Wound associated pain: none  Diabetic: Yes  Smoker: No    Past Medical History:   Diagnosis Date    A-fib ([facility]) 2/2/2023    Adverse effect of anesthesia     general anesthesia and he didnt wake up for 5 days     Allergy to alpha-gal     CAD (coronary artery disease) 2019    triple bypass - at [facility]    Chronic pain     bilaterial foot pain    Diabetes ([facility])     type 2    Diabetic foot ulcer ([facility]) 7/25/2012    Diabetic neuropathy ([facility])     GERD (gastroesophageal reflux disease)     Hypertension     Ill-defined condition     Diabetic peripheral neuropathy    Morbid obesity ([facility])     Osteomyelitis of great toe of right foot ([facility]) 7/30/2021    Right foot ulcer, with fat layer exposed ([facility]) 5/21/2021    Sleep apnea     untreated - patient states machine dries our his mouth and nares so he can't breath      Past Surgical History:   Procedure Laterality Date    CABG, ARTERY-VEIN, THREE  09/14/2018    CABG    COLONOSCOPY N/A 10/14/2020    COLONOSCOPY performed by Ryley Sanchez MD at [facility] ENDOSCOPY    GASTRIC BYPASS SURGERY      OTHER SURGICAL HISTORY      two toes amputated from right foot, the great toe and next one    [place] UNLISTED PROCEDURE CARDIAC SURGERY  2008    stent x1     History reviewed. No pertinent family history.   Social History     Tobacco Use    Smoking

## 2024-06-19 NOTE — FLOWSHEET NOTE
06/19/24 0826   Wound 06/19/24 Foot Plantar;Right   Date First Assessed/Time First Assessed: 06/19/24 0826   Present on Original Admission: Yes  Wound Approximate Age at First Assessment (Weeks): 32 weeks  Primary Wound Type: Diabetic Ulcer  Location: Foot  Wound Location Orientation: Plantar;Right   Wound Image    Wound Etiology Diabetic   Dressing Status Old drainage noted   Wound Cleansed Cleansed with saline   Wound Length (cm) 3.2 cm   Wound Width (cm) 3.4 cm   Wound Depth (cm) 0.5 cm   Wound Surface Area (cm^2) 10.88 cm^2   Wound Volume (cm^3) 5.44 cm^3   Drainage Amount Moderate (25-50%)   Drainage Description Serous   Odor None   Ann Marie-wound Assessment Maceration;Hyperkeratosis (callous)   Margins Defined edges   Wound Thickness Description not for Pressure Injury Full thickness     BP (!) 182/81   Pulse 66   Temp 98.5 °F (36.9 °C) (Temporal)   Resp 18

## 2024-06-19 NOTE — FLOWSHEET NOTE
06/19/24 0901   Wound 06/19/24 Foot Plantar;Right   Date First Assessed/Time First Assessed: 06/19/24 0826   Present on Original Admission: Yes  Wound Approximate Age at First Assessment (Weeks): 32 weeks  Primary Wound Type: Diabetic Ulcer  Location: Foot  Wound Location Orientation: Plantar;Right   Dressing Status New dressing applied   Dressing/Treatment Collagen with Ag  (Exudry and TCC)   Offloading for Diabetic Foot Ulcers Total contact cast     Total Contact Cast    NAME:  Chilo Parry  YOB: 1965  MEDICAL RECORD NUMBER:  [id number]  DATE:  6/19/2024    Goal:  Patient will maintain integrity of cast, avoid mobility hazards, and report complications that may occur (foul odor, pain, numbness, cracked cast).     Removed old contact cast if indicated and wash extremity with soap and water.  Applied ordered dressing.  Applied per   Applied in clinic to Right lower leg.  Allow cast to dry.   Instructed patient to report to health care provider, including wound care center, any back pain, hip pain, or leg pain, numbness of toes, or any odor coming from the cast.   Instructed patient not to stick any foreign objects down into cast.  Instructed patient to utilize assistive devices(crutches, cane or walker) as ordered.  Instructed patient to continue offloading as directed.       Applied cast per  Guidelines    Electronically signed by Radha Garcia RN on 6/19/2024 at 9:01 AM

## 2024-06-26 ENCOUNTER — HOSPITAL ENCOUNTER (OUTPATIENT)
Facility: HOSPITAL | Age: 59
Discharge: HOME | End: 2024-06-26
Attending: ORTHOPAEDIC SURGERY
Payer: MEDICARE

## 2024-06-26 VITALS
HEART RATE: 57 BPM | SYSTOLIC BLOOD PRESSURE: 153 MMHG | RESPIRATION RATE: 16 BRPM | DIASTOLIC BLOOD PRESSURE: 71 MMHG | TEMPERATURE: 98.2 F

## 2024-06-26 DIAGNOSIS — E11.610 CHARCOT FOOT DUE TO DIABETES MELLITUS (HCC): Primary | ICD-10-CM

## 2024-06-26 DIAGNOSIS — L97.509 TYPE 2 DIABETES MELLITUS WITH FOOT ULCER, UNSPECIFIED WHETHER LONG TERM INSULIN USE (HCC): ICD-10-CM

## 2024-06-26 DIAGNOSIS — E11.621 TYPE 2 DIABETES MELLITUS WITH FOOT ULCER, UNSPECIFIED WHETHER LONG TERM INSULIN USE (HCC): ICD-10-CM

## 2024-06-26 PROCEDURE — 29445 APPL RIGID TOT CNTC LEG CAST: CPT

## 2024-06-26 RX ORDER — GENTAMICIN SULFATE 1 MG/G
OINTMENT TOPICAL ONCE
OUTPATIENT
Start: 2024-06-26 | End: 2024-06-26

## 2024-06-26 RX ORDER — LIDOCAINE HYDROCHLORIDE 20 MG/ML
JELLY TOPICAL ONCE
OUTPATIENT
Start: 2024-06-26 | End: 2024-06-26

## 2024-06-26 RX ORDER — LIDOCAINE 50 MG/G
OINTMENT TOPICAL ONCE
OUTPATIENT
Start: 2024-06-26 | End: 2024-06-26

## 2024-06-26 RX ORDER — BETAMETHASONE DIPROPIONATE 0.5 MG/G
CREAM TOPICAL ONCE
OUTPATIENT
Start: 2024-06-26 | End: 2024-06-26

## 2024-06-26 RX ORDER — GINSENG 100 MG
CAPSULE ORAL ONCE
OUTPATIENT
Start: 2024-06-26 | End: 2024-06-26

## 2024-06-26 RX ORDER — AMLODIPINE BESYLATE 10 MG/1
10 TABLET ORAL DAILY
COMMUNITY

## 2024-06-26 RX ORDER — LIDOCAINE HYDROCHLORIDE 40 MG/ML
SOLUTION TOPICAL ONCE
OUTPATIENT
Start: 2024-06-26 | End: 2024-06-26

## 2024-06-26 RX ORDER — LIDOCAINE 40 MG/G
CREAM TOPICAL ONCE
OUTPATIENT
Start: 2024-06-26 | End: 2024-06-26

## 2024-06-26 RX ORDER — IBUPROFEN 200 MG
TABLET ORAL ONCE
OUTPATIENT
Start: 2024-06-26 | End: 2024-06-26

## 2024-06-26 RX ORDER — BACITRACIN ZINC AND POLYMYXIN B SULFATE 500; 1000 [USP'U]/G; [USP'U]/G
OINTMENT TOPICAL ONCE
OUTPATIENT
Start: 2024-06-26 | End: 2024-06-26

## 2024-06-26 RX ORDER — SODIUM CHLOR/HYPOCHLOROUS ACID 0.033 %
SOLUTION, IRRIGATION IRRIGATION ONCE
OUTPATIENT
Start: 2024-06-26 | End: 2024-06-26

## 2024-06-26 RX ORDER — TRIAMCINOLONE ACETONIDE 1 MG/G
OINTMENT TOPICAL ONCE
OUTPATIENT
Start: 2024-06-26 | End: 2024-06-26

## 2024-06-26 RX ORDER — CLOBETASOL PROPIONATE 0.5 MG/G
OINTMENT TOPICAL ONCE
OUTPATIENT
Start: 2024-06-26 | End: 2024-06-26

## 2024-06-26 NOTE — DISCHARGE INSTRUCTIONS
Discharge Instructions [facility]  [address]   [address]  [place] [address]   Telephone: [phone]     FAX [phone]    NAME:  Chilo Parry  YOB: 1965  MEDICAL RECORD NUMBER:  [id number]  DATE:  6/26/2024    CPT code:Total contact cast ([id number])    WOUND CARE ORDERS:  Right foot :Cleanse with soap and water , apply primary dressing Collagen Ag covered with secondary dressing Exudry.  Apply Total Contact Cast applied by Dr. Lieberman  Pt./pcg/HH nurse to change (freq) Once a week in clinic  and as needed for compromise.Follow up with provider in 1 Week(s).   Home Health Agency: none  TREATMENT ORDERS:    Elevate leg(s) above the level of the heart when sitting.   Avoid prolonged standing in one place.  Do no get dressing/wrap wet.  Follow Diet as prescribed:   [x] Diet as tolerated: [] Calorie Diabetic Diet: Low carb and no Sugar [x] No Added Salt:  [] Increase Protein: [] Limit the amount of liquid you are drinking and avoid drinking in between meals     Return Appointment:  [x] Return Appointment: With  in  1 Week(s)  [] Nurse Visit :   [] Ordered tests:    Electronically signed Betzy Sprague RN on 6/26/2024 at 9:50 AM     Wound Care Center Information: Should you experience any significant changes in your wound(s) or have questions about your wound care, please contact the [facility] at MONDAY - FRIDAY 8:00 am - 4:30.  If you need help with your wound outside these hours and cannot wait until we are again available, contact your PCP or go to the hospital emergency room.   PLEASE NOTE: IF YOU ARE UNABLE TO OBTAIN WOUND SUPPLIES, CONTINUE TO USE THE SUPPLIES YOU HAVE AVAILABLE UNTIL YOU ARE ABLE TO REACH US. IT IS MOST IMPORTANT TO KEEP THE WOUND COVERED AT ALL TIMES.     Physician Signature:_______________________    Date: ___________ Time:  ____________

## 2024-06-26 NOTE — FLOWSHEET NOTE
06/26/24 0924   Right Leg Edema Point of Measurement   Leg circumference 38.5 cm   Ankle circumference 28 cm   RLE Neurovascular Assessment   Capillary Refill Less than/Equal to 3 seconds   Color Appropriate for Ethnicity   Temperature Warm   R Pedal Pulse +2   Wound 06/19/24 Foot Plantar;Right   Date First Assessed/Time First Assessed: 06/19/24 0826   Present on Original Admission: Yes  Wound Approximate Age at First Assessment (Weeks): 32 weeks  Primary Wound Type: Diabetic Ulcer  Location: Foot  Wound Location Orientation: Plantar;Right   Wound Image    Wound Etiology Diabetic   Dressing Status Old drainage noted   Wound Cleansed Soap and water   Offloading for Diabetic Foot Ulcers Total contact cast   Wound Length (cm) 0.8 cm   Wound Width (cm) 1.2 cm   Wound Depth (cm) 0.2 cm   Wound Surface Area (cm^2) 0.96 cm^2   Change in Wound Size % (l*w) 91.18   Wound Volume (cm^3) 0.192 cm^3   Wound Healing % 96   Wound Assessment Pink/red   Drainage Amount Large (50-75% saturated)   Drainage Description Serosanguinous   Odor None   Ann Marie-wound Assessment Maceration   Margins Defined edges   Wound Thickness Description not for Pressure Injury Full thickness   Pain Assessment   Pain Assessment None - Denies Pain       BP (!) 153/71   Pulse 57   Temp 98.2 °F (36.8 °C) (Temporal)   Resp 16

## 2024-06-27 NOTE — PROGRESS NOTES
nonfocal.  HEENT: Normocephalic, atraumatic. EOMI. Conjunctiva clear. No scleral icterus.   Chest: Respirations nonlabored.  Abdomen:  Nondistended.     Lower extremities: color normal; temperature normal. Hair growth is not present. Calves are supple, nontender, approximately equally sized in comparison.      Focused Lower Extremity Exam:     Vascular exam: Apparently had vascular testing scheduled today, which he is going to reschedule.     Ulcer Location: Rt. foot plantar midfoot wound.   Etiology: diabetic/Charcot foot  Wound:  0.8 x 1.2 x 0.2 cm - much improved!  Ulcer bed: Mixed Granular/Fibrotic    Periwound: Normal  Exudate: Scant/small amount Serous exudate  Depth of Wound: To Fat Layer         Assessment:      59 y.o. male with Rt. foot neuropathic ulcer.  Charcot foot  Diabetic      Plan:   Wound was at least mechanically debrided using a 4x4. More extensive debridement, if done, is outlined below.     Dressing: Silver alginate, TCC applied in the usual manner.   Frequency: weekly     Recommended consultation with an orthopedic surgeon or good podiatrist specializing in foot care. Seeing Dr. Ryley Alarcon Aug 8th.     Patient understood and agrees with plan. Questions answered.     Weekly visits and serial debridements also discussed.  Follow up with me in 1 week     Signed By: Phoenix Lieberman MD

## 2024-07-03 ENCOUNTER — HOSPITAL ENCOUNTER (OUTPATIENT)
Facility: HOSPITAL | Age: 59
Discharge: HOME | End: 2024-07-03
Attending: ORTHOPAEDIC SURGERY
Payer: MEDICARE

## 2024-07-03 VITALS
SYSTOLIC BLOOD PRESSURE: 142 MMHG | HEART RATE: 64 BPM | RESPIRATION RATE: 18 BRPM | DIASTOLIC BLOOD PRESSURE: 64 MMHG | TEMPERATURE: 98.2 F

## 2024-07-03 DIAGNOSIS — L97.509 TYPE 2 DIABETES MELLITUS WITH FOOT ULCER, UNSPECIFIED WHETHER LONG TERM INSULIN USE (HCC): ICD-10-CM

## 2024-07-03 DIAGNOSIS — E11.610 CHARCOT FOOT DUE TO DIABETES MELLITUS (HCC): Primary | ICD-10-CM

## 2024-07-03 DIAGNOSIS — E11.621 TYPE 2 DIABETES MELLITUS WITH FOOT ULCER, UNSPECIFIED WHETHER LONG TERM INSULIN USE (HCC): ICD-10-CM

## 2024-07-03 PROCEDURE — 29445 APPL RIGID TOT CNTC LEG CAST: CPT

## 2024-07-03 RX ORDER — LIDOCAINE HYDROCHLORIDE 40 MG/ML
SOLUTION TOPICAL ONCE
OUTPATIENT
Start: 2024-07-03 | End: 2024-07-03

## 2024-07-03 RX ORDER — LIDOCAINE 40 MG/G
CREAM TOPICAL ONCE
OUTPATIENT
Start: 2024-07-03 | End: 2024-07-03

## 2024-07-03 RX ORDER — GINSENG 100 MG
CAPSULE ORAL ONCE
OUTPATIENT
Start: 2024-07-03 | End: 2024-07-03

## 2024-07-03 RX ORDER — TRIAMCINOLONE ACETONIDE 1 MG/G
OINTMENT TOPICAL ONCE
OUTPATIENT
Start: 2024-07-03 | End: 2024-07-03

## 2024-07-03 RX ORDER — CLOBETASOL PROPIONATE 0.5 MG/G
OINTMENT TOPICAL ONCE
OUTPATIENT
Start: 2024-07-03 | End: 2024-07-03

## 2024-07-03 RX ORDER — SODIUM CHLOR/HYPOCHLOROUS ACID 0.033 %
SOLUTION, IRRIGATION IRRIGATION ONCE
OUTPATIENT
Start: 2024-07-03 | End: 2024-07-03

## 2024-07-03 RX ORDER — IBUPROFEN 200 MG
TABLET ORAL ONCE
OUTPATIENT
Start: 2024-07-03 | End: 2024-07-03

## 2024-07-03 RX ORDER — BETAMETHASONE DIPROPIONATE 0.5 MG/G
CREAM TOPICAL ONCE
OUTPATIENT
Start: 2024-07-03 | End: 2024-07-03

## 2024-07-03 RX ORDER — LIDOCAINE HYDROCHLORIDE 20 MG/ML
JELLY TOPICAL ONCE
OUTPATIENT
Start: 2024-07-03 | End: 2024-07-03

## 2024-07-03 RX ORDER — BACITRACIN ZINC AND POLYMYXIN B SULFATE 500; 1000 [USP'U]/G; [USP'U]/G
OINTMENT TOPICAL ONCE
OUTPATIENT
Start: 2024-07-03 | End: 2024-07-03

## 2024-07-03 RX ORDER — GENTAMICIN SULFATE 1 MG/G
OINTMENT TOPICAL ONCE
OUTPATIENT
Start: 2024-07-03 | End: 2024-07-03

## 2024-07-03 RX ORDER — LIDOCAINE 50 MG/G
OINTMENT TOPICAL ONCE
OUTPATIENT
Start: 2024-07-03 | End: 2024-07-03

## 2024-07-03 NOTE — FLOWSHEET NOTE
07/03/24 0921   Right Leg Edema Point of Measurement   Leg circumference 38.9 cm   Ankle circumference 27.2 cm   RLE Neurovascular Assessment   Capillary Refill Less than/Equal to 3 seconds   Color Appropriate for Ethnicity   Temperature Warm   R Pedal Pulse +2   Wound 06/19/24 Foot Plantar;Right   Date First Assessed/Time First Assessed: 06/19/24 0826   Present on Original Admission: Yes  Wound Approximate Age at First Assessment (Weeks): 32 weeks  Primary Wound Type: Diabetic Ulcer  Location: Foot  Wound Location Orientation: Plantar;Right   Wound Image    Wound Etiology Diabetic   Dressing Status Old drainage noted   Wound Cleansed Soap and water   Offloading for Diabetic Foot Ulcers Total contact cast   Wound Length (cm) 0.8 cm   Wound Width (cm) 1.2 cm   Wound Depth (cm) 0.2 cm   Wound Surface Area (cm^2) 0.96 cm^2   Change in Wound Size % (l*w) 91.18   Wound Volume (cm^3) 0.192 cm^3   Wound Healing % 96   Wound Assessment Pink/red   Drainage Amount Moderate (25-50%)   Drainage Description Serosanguinous   Odor None   Ann Marie-wound Assessment Maceration;Fragile   Margins Defined edges   Wound Thickness Description not for Pressure Injury Full thickness   Pain Assessment   Pain Assessment None - Denies Pain       BP (!) 142/64   Pulse 64   Temp 98.2 °F (36.8 °C) (Temporal)   Resp 18

## 2024-07-03 NOTE — PROGRESS NOTES
Family History   History reviewed. No pertinent family history.      Social History                Tobacco Use    Smoking status: Former       Current packs/day: 0.00       Types: Cigarettes       Quit date: 2012       Years since quittin.4    Smokeless tobacco: Never   Substance Use Topics    Alcohol use: No       Home Medications                 Prior to Admission medications    Medication Sig Start Date End Date Taking? Authorizing Provider   insulin regular (HUMULIN R;NOVOLIN R) 100 UNIT/ML injection Inject 2-3 Units into the skin 3 times daily (before meals)     Yes Hansa Pan MD   oxyCODONE (OXY-IR) 15 MG immediate release tablet Take 1 tablet by mouth every 6 hours as needed for Pain. Max Daily Amount: 60 mg     Yes ProviderHansa MD   tamsulosin (FLOMAX) 0.4 MG capsule Take 1 capsule by mouth in the morning and at bedtime     Yes Hansa Pan MD   apixaban (ELIQUIS) 5 MG TABS tablet Take 1 tablet by mouth 2 times daily 22     Automatic Reconciliation, Ar   furosemide (LASIX) 20 MG tablet Take 1 tablet by mouth as needed 23     Automatic Reconciliation, Ar   insulin NPH (HUMULIN N;NOVOLIN N) 100 UNIT/ML injection vial 3-9 Units as needed 23     Automatic Reconciliation, Ar   lovastatin (MEVACOR) 20 MG tablet TAKE ONE TABLET BY MOUTH NIGHTLY 14     Automatic Reconciliation, Ar   metoprolol succinate (TOPROL XL) 25 MG extended release tablet Take 0.5 tablets by mouth in the morning and at bedtime       Automatic Reconciliation, Ar         No Known Allergies      Review of Systems:  A comprehensive review of systems was negative except for that written in the History of Present Illness.and PMH.     Objective:      Physical Exam:      VSS, Afebrile   General: well developed, well nourished, pleasant , NAD. Hygiene good  Psych: cooperative. Pleasant. No anxiety or depression. Normal mood and affect.  Neuro: alert and oriented to person/place/situation.

## 2024-07-03 NOTE — FLOWSHEET NOTE
Total Contact Cast    NAME:  Chilo Parry  YOB: 1965  MEDICAL RECORD NUMBER:  [id number]  DATE:  7/3/2024    Goal:  Patient will maintain integrity of cast, avoid mobility hazards, and report complications that may occur (foul odor, pain, numbness, cracked cast).     Removed old contact cast if indicated and wash extremity with soap and water.  Applied ordered dressing.  Applied per   Applied in clinic to right lower leg.  Allow cast to dry.   Instructed patient to report to health care provider, including wound care center, any back pain, hip pain, or leg pain, numbness of toes, or any odor coming from the cast.   Instructed patient not to stick any foreign objects down into cast.  Instructed patient to utilize assistive devices(crutches, cane or walker) as ordered.  Instructed patient to continue offloading as directed.       Applied cast per  Guidelines    Electronically signed by Betzy Sprague RN on 7/3/2024 at 10:00 AM

## 2024-07-03 NOTE — DISCHARGE INSTRUCTIONS
Discharge Instructions [facility]  [address]   [address], [address]  [place] [address]   Telephone: [phone]     FAX [phone]    NAME:  Chilo Parry  YOB: 1965  MEDICAL RECORD NUMBER:  [id number]  DATE:  7/3/2024    CPT code:Total contact cast (39034)    WOUND CARE ORDERS:  Right plantar  :Cleanse with soap and water , apply primary dressing Collagen with AG covered with secondary dressing Exudry.  Apply Total Contact Cast applied by Dr. Lieberman  Pt./pcg/HH nurse to change (freq) Once a week in clinic  and as needed for compromise.Follow up with provider in 1 Week(s).   Home Health Agency: none  TREATMENT ORDERS:    Elevate leg(s) above the level of the heart when sitting.   Avoid prolonged standing in one place.  Do no get dressing/wrap wet.  Follow Diet as prescribed:   [x] Diet as tolerated: [] Calorie Diabetic Diet: Low carb and no Sugar [] No Added Salt:no more then 2,000 mg daily  [] Increase Protein: [] Limit the amount of liquid you are drinking and avoid drinking in between meals (limit to 2 quarts daily)     Return Appointment:  [x] Return Appointment: With  in  1 Week(s)  [] Nurse Visit :   [] Ordered tests:    Electronically signed Betzy Sprague RN on 7/3/2024 at 9:44 AM     Wound Care Center Information: Should you experience any significant changes in your wound(s) or have questions about your wound care, please contact the [facility] at MONDAY - FRIDAY 8:00 am - 4:30.  If you need help with your wound outside these hours and cannot wait until we are again available, contact your PCP or go to the hospital emergency room.   PLEASE NOTE: IF YOU ARE UNABLE TO OBTAIN WOUND SUPPLIES, CONTINUE TO USE THE SUPPLIES YOU HAVE AVAILABLE UNTIL YOU ARE ABLE TO REACH US. IT IS MOST IMPORTANT TO KEEP THE WOUND COVERED AT ALL TIMES.     Physician Signature:_______________________    Date: ___________ Time:  ____________

## 2024-07-10 ENCOUNTER — HOSPITAL ENCOUNTER (OUTPATIENT)
Facility: HOSPITAL | Age: 59
Discharge: HOME | End: 2024-07-10
Attending: ORTHOPAEDIC SURGERY
Payer: MEDICARE

## 2024-07-10 VITALS
RESPIRATION RATE: 18 BRPM | SYSTOLIC BLOOD PRESSURE: 151 MMHG | TEMPERATURE: 98.1 F | DIASTOLIC BLOOD PRESSURE: 78 MMHG | HEART RATE: 62 BPM

## 2024-07-10 DIAGNOSIS — E11.621 TYPE 2 DIABETES MELLITUS WITH FOOT ULCER, UNSPECIFIED WHETHER LONG TERM INSULIN USE (HCC): ICD-10-CM

## 2024-07-10 DIAGNOSIS — L97.509 TYPE 2 DIABETES MELLITUS WITH FOOT ULCER, UNSPECIFIED WHETHER LONG TERM INSULIN USE (HCC): ICD-10-CM

## 2024-07-10 DIAGNOSIS — E11.610 CHARCOT FOOT DUE TO DIABETES MELLITUS (HCC): Primary | ICD-10-CM

## 2024-07-10 PROCEDURE — 11042 DBRDMT SUBQ TIS 1ST 20SQCM/<: CPT

## 2024-07-10 RX ORDER — CLOBETASOL PROPIONATE 0.5 MG/G
OINTMENT TOPICAL ONCE
OUTPATIENT
Start: 2024-07-10 | End: 2024-07-10

## 2024-07-10 RX ORDER — LIDOCAINE HYDROCHLORIDE 20 MG/ML
JELLY TOPICAL ONCE
OUTPATIENT
Start: 2024-07-10 | End: 2024-07-10

## 2024-07-10 RX ORDER — SODIUM CHLOR/HYPOCHLOROUS ACID 0.033 %
SOLUTION, IRRIGATION IRRIGATION ONCE
OUTPATIENT
Start: 2024-07-10 | End: 2024-07-10

## 2024-07-10 RX ORDER — LIDOCAINE 40 MG/G
CREAM TOPICAL ONCE
OUTPATIENT
Start: 2024-07-10 | End: 2024-07-10

## 2024-07-10 RX ORDER — BETAMETHASONE DIPROPIONATE 0.5 MG/G
CREAM TOPICAL ONCE
OUTPATIENT
Start: 2024-07-10 | End: 2024-07-10

## 2024-07-10 RX ORDER — BACITRACIN ZINC AND POLYMYXIN B SULFATE 500; 1000 [USP'U]/G; [USP'U]/G
OINTMENT TOPICAL ONCE
OUTPATIENT
Start: 2024-07-10 | End: 2024-07-10

## 2024-07-10 RX ORDER — LIDOCAINE HYDROCHLORIDE 40 MG/ML
SOLUTION TOPICAL ONCE
OUTPATIENT
Start: 2024-07-10 | End: 2024-07-10

## 2024-07-10 RX ORDER — TRIAMCINOLONE ACETONIDE 1 MG/G
OINTMENT TOPICAL ONCE
OUTPATIENT
Start: 2024-07-10 | End: 2024-07-10

## 2024-07-10 RX ORDER — GENTAMICIN SULFATE 1 MG/G
OINTMENT TOPICAL ONCE
OUTPATIENT
Start: 2024-07-10 | End: 2024-07-10

## 2024-07-10 RX ORDER — GINSENG 100 MG
CAPSULE ORAL ONCE
OUTPATIENT
Start: 2024-07-10 | End: 2024-07-10

## 2024-07-10 RX ORDER — LIDOCAINE 50 MG/G
OINTMENT TOPICAL ONCE
OUTPATIENT
Start: 2024-07-10 | End: 2024-07-10

## 2024-07-10 RX ORDER — IBUPROFEN 200 MG
TABLET ORAL ONCE
OUTPATIENT
Start: 2024-07-10 | End: 2024-07-10

## 2024-07-10 NOTE — PROGRESS NOTES
Wound Center  Progress Note     Date of Service: 7/10/24      Date of Initial Visit for Current Problem:  6/19/24     Subjective:      Chief Complaint:  Chilo Parry is a 59 y.o.  male who presents with Rt. foot plantar wound of 8 months duration.      Referred by:  Dr. Knight     HPI:   He has had diabetes for about 10 years, and apparently has had some issues with glucose control in the past. Last A1c was about 7.5.  He notes that his other foot has started to have a deformity similar to the right foot.  Wound caused by: chronic pressure/irritation due to Charcot foot.  Current wound care:  Offloading wound: no  Appetite: good  Wound associated pain: none  Diabetic: Yes  Smoker: No     Past Medical History           Past Medical History:   Diagnosis Date    A-fib ([facility]) 2/2/2023    Adverse effect of anesthesia       general anesthesia and he didnt wake up for 5 days     Allergy to alpha-gal      CAD (coronary artery disease) 2019     triple bypass - at [facility]    Chronic pain       bilaterial foot pain    Diabetes ([facility])       type 2    Diabetic foot ulcer ([facility]) 7/25/2012    Diabetic neuropathy ([facility])      GERD (gastroesophageal reflux disease)      Hypertension      Ill-defined condition       Diabetic peripheral neuropathy    Morbid obesity ([facility])      Osteomyelitis of great toe of right foot ([facility]) 7/30/2021    Right foot ulcer, with fat layer exposed ([facility]) 5/21/2021    Sleep apnea       untreated - patient states machine dries our his mouth and nares so he can't breath         Past Surgical History             Past Surgical History:   Procedure Laterality Date    CABG, ARTERY-VEIN, THREE   09/14/2018     CABG    COLONOSCOPY N/A 10/14/2020     COLONOSCOPY performed by Ryley Sanchez MD at [facility] ENDOSCOPY    GASTRIC BYPASS SURGERY        OTHER SURGICAL HISTORY         two toes amputated from right foot, the great toe and next one    [place] UNLISTED PROCEDURE CARDIAC SURGERY   2008     stent x1

## 2024-07-10 NOTE — FLOWSHEET NOTE
07/10/24 1024   LLE Neurovascular Assessment   Capillary Refill Less than/Equal to 3 seconds   Color Appropriate for Ethnicity   Temperature Warm   LLE Sensation  No pain   L Pedal Pulse +2   Wound 07/10/24 Foot Lateral;Right   Date First Assessed/Time First Assessed: 07/10/24 1026   Wound Approximate Age at First Assessment (Weeks): 1 weeks  Location: Foot  Wound Location Orientation: Lateral;Right   Wound Image    Wound Etiology Diabetic   Wound Cleansed Soap and water   Wound Length (cm) 1.5 cm   Wound Width (cm) 2.1 cm   Wound Depth (cm) 0.1 cm   Wound Surface Area (cm^2) 3.15 cm^2   Wound Volume (cm^3) 0.315 cm^3   Wound Assessment Slough   Drainage Amount Small (< 25%)   Drainage Description Serous   Odor None   Ann Marie-wound Assessment Maceration   Margins Defined edges   Wound Thickness Description not for Pressure Injury Full thickness   Wound 06/19/24 Foot Plantar;Right   Date First Assessed/Time First Assessed: 06/19/24 0826   Present on Original Admission: Yes  Wound Approximate Age at First Assessment (Weeks): 32 weeks  Primary Wound Type: Diabetic Ulcer  Location: Foot  Wound Location Orientation: Plantar;Right   Wound Image    Wound Etiology Diabetic   Dressing Status Old drainage noted   Wound Cleansed Soap and water   Wound Length (cm) 0.5 cm   Wound Width (cm) 0.8 cm   Wound Depth (cm) 0.2 cm   Wound Surface Area (cm^2) 0.4 cm^2   Change in Wound Size % (l*w) 96.32   Wound Volume (cm^3) 0.08 cm^3   Wound Healing % 99   Wound Assessment Pink/red   Drainage Amount Small (< 25%)   Drainage Description Serosanguinous   Odor None   Ann Marie-wound Assessment Hyperkeratosis (callous)   Margins Defined edges   Wound Thickness Description not for Pressure Injury Full thickness     BP (!) 151/78   Pulse 62   Temp 98.1 °F (36.7 °C) (Temporal)   Resp 18

## 2024-08-23 ENCOUNTER — HOSPITAL ENCOUNTER (EMERGENCY)
Facility: HOSPITAL | Age: 59
Discharge: HOME | End: 2024-08-23
Payer: MEDICARE

## 2024-08-23 ENCOUNTER — APPOINTMENT (OUTPATIENT)
Facility: HOSPITAL | Age: 59
End: 2024-08-23
Payer: MEDICARE

## 2024-08-23 VITALS
HEART RATE: 70 BPM | SYSTOLIC BLOOD PRESSURE: 157 MMHG | OXYGEN SATURATION: 99 % | DIASTOLIC BLOOD PRESSURE: 64 MMHG | TEMPERATURE: 98.4 F | BODY MASS INDEX: 33.94 KG/M2 | WEIGHT: 236.55 LBS | RESPIRATION RATE: 18 BRPM

## 2024-08-23 DIAGNOSIS — L03.115 CELLULITIS OF RIGHT FOOT: ICD-10-CM

## 2024-08-23 DIAGNOSIS — E11.621 TYPE 2 DIABETES MELLITUS WITH RIGHT DIABETIC FOOT ULCER (HCC): Primary | ICD-10-CM

## 2024-08-23 DIAGNOSIS — L97.519 TYPE 2 DIABETES MELLITUS WITH RIGHT DIABETIC FOOT ULCER (HCC): Primary | ICD-10-CM

## 2024-08-23 LAB
ALBUMIN SERPL-MCNC: 2.9 G/DL (ref 3.5–5)
ALBUMIN/GLOB SERPL: 1 (ref 1.1–2.2)
ALP SERPL-CCNC: 64 U/L (ref 45–117)
ALT SERPL-CCNC: 16 U/L (ref 12–78)
ANION GAP SERPL CALC-SCNC: 6 MMOL/L (ref 5–15)
AST SERPL-CCNC: 12 U/L (ref 15–37)
BASOPHILS # BLD: 0 K/UL (ref 0–0.1)
BASOPHILS NFR BLD: 0 % (ref 0–1)
BILIRUB SERPL-MCNC: 0.5 MG/DL (ref 0.2–1)
BUN SERPL-MCNC: 25 MG/DL (ref 6–20)
BUN/CREAT SERPL: 19 (ref 12–20)
CALCIUM SERPL-MCNC: 8.5 MG/DL (ref 8.5–10.1)
CHLORIDE SERPL-SCNC: 102 MMOL/L (ref 97–108)
CO2 SERPL-SCNC: 28 MMOL/L (ref 21–32)
CREAT SERPL-MCNC: 1.31 MG/DL (ref 0.7–1.3)
CRP SERPL-MCNC: 7.08 MG/DL (ref 0–0.3)
DIFFERENTIAL METHOD BLD: ABNORMAL
EOSINOPHIL # BLD: 0 K/UL (ref 0–0.4)
EOSINOPHIL NFR BLD: 0 % (ref 0–7)
ERYTHROCYTE [DISTWIDTH] IN BLOOD BY AUTOMATED COUNT: 13.2 % (ref 11.5–14.5)
ERYTHROCYTE [SEDIMENTATION RATE] IN BLOOD: 13 MM/HR (ref 0–20)
GLOBULIN SER CALC-MCNC: 3 G/DL (ref 2–4)
GLUCOSE SERPL-MCNC: 176 MG/DL (ref 65–100)
HCT VFR BLD AUTO: 33.3 % (ref 36.6–50.3)
HGB BLD-MCNC: 11 G/DL (ref 12.1–17)
IMM GRANULOCYTES # BLD AUTO: 0 K/UL (ref 0–0.04)
IMM GRANULOCYTES NFR BLD AUTO: 0 % (ref 0–0.5)
LYMPHOCYTES # BLD: 1.8 K/UL (ref 0.8–3.5)
LYMPHOCYTES NFR BLD: 16 % (ref 12–49)
MCH RBC QN AUTO: 30.2 PG (ref 26–34)
MCHC RBC AUTO-ENTMCNC: 33 G/DL (ref 30–36.5)
MCV RBC AUTO: 91.5 FL (ref 80–99)
MONOCYTES # BLD: 0.9 K/UL (ref 0–1)
MONOCYTES NFR BLD: 8 % (ref 5–13)
NEUTS SEG # BLD: 8.5 K/UL (ref 1.8–8)
NEUTS SEG NFR BLD: 76 % (ref 32–75)
NRBC # BLD: 0 K/UL (ref 0–0.01)
NRBC BLD-RTO: 0 PER 100 WBC
PLATELET # BLD AUTO: 286 K/UL (ref 150–400)
PMV BLD AUTO: 10.1 FL (ref 8.9–12.9)
POTASSIUM SERPL-SCNC: 4.9 MMOL/L (ref 3.5–5.1)
PROT SERPL-MCNC: 5.9 G/DL (ref 6.4–8.2)
RBC # BLD AUTO: 3.64 M/UL (ref 4.1–5.7)
SODIUM SERPL-SCNC: 136 MMOL/L (ref 136–145)
WBC # BLD AUTO: 11.2 K/UL (ref 4.1–11.1)

## 2024-08-23 PROCEDURE — 99284 EMERGENCY DEPT VISIT MOD MDM: CPT

## 2024-08-23 PROCEDURE — 96365 THER/PROPH/DIAG IV INF INIT: CPT

## 2024-08-23 PROCEDURE — 2580000003 HC RX 258: Performed by: PHYSICIAN ASSISTANT

## 2024-08-23 PROCEDURE — 85652 RBC SED RATE AUTOMATED: CPT

## 2024-08-23 PROCEDURE — 80053 COMPREHEN METABOLIC PANEL: CPT

## 2024-08-23 PROCEDURE — 85025 COMPLETE CBC W/AUTO DIFF WBC: CPT

## 2024-08-23 PROCEDURE — 73630 X-RAY EXAM OF FOOT: CPT

## 2024-08-23 PROCEDURE — 87040 BLOOD CULTURE FOR BACTERIA: CPT

## 2024-08-23 PROCEDURE — 86140 C-REACTIVE PROTEIN: CPT

## 2024-08-23 PROCEDURE — 6360000002 HC RX W HCPCS: Performed by: PHYSICIAN ASSISTANT

## 2024-08-23 PROCEDURE — 36415 COLL VENOUS BLD VENIPUNCTURE: CPT

## 2024-08-23 RX ORDER — CEPHALEXIN 250 MG/1
250 CAPSULE ORAL 4 TIMES DAILY
Qty: 40 CAPSULE | Refills: 0 | Status: SHIPPED | OUTPATIENT
Start: 2024-08-23 | End: 2024-09-02

## 2024-08-23 RX ORDER — 0.9 % SODIUM CHLORIDE 0.9 %
1000 INTRAVENOUS SOLUTION INTRAVENOUS ONCE
Status: COMPLETED | OUTPATIENT
Start: 2024-08-23 | End: 2024-08-23

## 2024-08-23 RX ORDER — SULFAMETHOXAZOLE AND TRIMETHOPRIM 800; 160 MG/1; MG/1
1 TABLET ORAL 2 TIMES DAILY
Qty: 20 TABLET | Refills: 0 | Status: SHIPPED | OUTPATIENT
Start: 2024-08-23 | End: 2024-09-02

## 2024-08-23 RX ADMIN — PIPERACILLIN AND TAZOBACTAM 4500 MG: 4; .5 INJECTION, POWDER, LYOPHILIZED, FOR SOLUTION INTRAVENOUS; PARENTERAL at 17:36

## 2024-08-23 RX ADMIN — SODIUM CHLORIDE 1000 ML: 9 INJECTION, SOLUTION INTRAVENOUS at 16:11

## 2024-08-23 ASSESSMENT — LIFESTYLE VARIABLES
HOW OFTEN DO YOU HAVE A DRINK CONTAINING ALCOHOL: NEVER
HOW MANY STANDARD DRINKS CONTAINING ALCOHOL DO YOU HAVE ON A TYPICAL DAY: PATIENT DOES NOT DRINK

## 2024-08-23 ASSESSMENT — PAIN SCALES - GENERAL: PAINLEVEL_OUTOF10: 3

## 2024-08-23 NOTE — DISCHARGE INSTRUCTIONS
instructed on your discharge paperwork. Return to the ER if you are unable to be seen or if you are unable to be seen in a timely manner.    Should you experience abdominal pain lasting greater than 6 hours, chest pain, difficulty breathing, fever/chills, numbness/tingling, skin changes or other symptoms that concern you, return to the ED sooner. If you feel worse over the next 24 hours, please return to the ED. We are available 24 hours a day. Thank you for trusting us with your care!

## 2024-08-25 LAB
BACTERIA SPEC CULT: NORMAL
BACTERIA SPEC CULT: NORMAL
SERVICE CMNT-IMP: NORMAL
SERVICE CMNT-IMP: NORMAL

## 2025-05-21 NOTE — DISCHARGE INSTRUCTIONS
Discharge Instructions/[facility] Wound Care Orders  [address]   [address], [address]  [place] [address]   Telephone: [phone]     FAX [phone]   NAME:  Chilo Parry  YOB: 1965  MEDICAL RECORD NUMBER:  [id number]  DATE:  6/19/2024    CPT Codes: Total contact cast ([id number])    HOME HEALTH:    Wound Care Orders:   Right foot wound :Cleanse with normal saline, apply primary dressing Collagen with AG cover with secondary dressing Exudry.  Apply Total Contact Cast applied by Dr. Lieberman   Pt./pcg/HH nurse to change (freq) Once a week in clinic  and as needed for compromise.F/U in 1 week.   [] Wound culture send  [] Antibiotic prescribe to the pharmacy   Home Health Agency:     Treatment Orders:   Elevate leg(s) above the level of the heart when sitting, if swelling present.  Avoid prolonged standing in one place.  Wear off-loading shoe/boot on the affected foot when walking.  Do not get dressing/cast wet.  Do not use objects to scratch inside cast/wrap.   Follow diet as prescribed:  [x] Diet as tolerated: [x] Diabetic Diet: Low carb no sugar [x] No Added Salt:  [] Increase Protein: [] Other:Limit the amount of liquid you are drinking and avoid drinking in between meals             Follow-up:  [x] Return Appointment: With   in  1 Week(s)  [] Ordered tests:    Electronically signed MONICA LEWIS RN on 6/19/2024 at 8:47 AM     Wound Care Center Information: Should you experience any significant changes in your wound(s) or have questions about your wound care, please contact the [facility] at MONDAY - FRIDAY 8:00 am - 4:30.  If you need help with your wound outside these hours and cannot wait until we are again available, contact your PCP or go to the hospital emergency room.     PLEASE NOTE: IF YOU ARE UNABLE TO OBTAIN WOUND SUPPLIES, CONTINUE TO USE THE SUPPLIES YOU HAVE AVAILABLE UNTIL YOU ARE ABLE TO REACH US. IT IS MOST IMPORTANT TO KEEP THE WOUND  day(s)

## (undated) DEVICE — DRAPE,REIN 53X77,STERILE: Brand: MEDLINE

## (undated) DEVICE — SET ADMIN 16ML TBNG L100IN 2 Y INJ SITE IV PIGGY BK DISP

## (undated) DEVICE — CONTAINER SPEC 20 ML LID NEUT BUFF FORMALIN 10 % POLYPR STS

## (undated) DEVICE — BAG SPEC BIOHZRD 10 X 10 IN --

## (undated) DEVICE — MEDI-VAC YANK SUCT HNDL W/TPRD BULBOUS TIP & NON-CONDUCTIVE: Brand: CARDINAL HEALTH

## (undated) DEVICE — DRAPE,EXTREMITY,89X128,STERILE: Brand: MEDLINE

## (undated) DEVICE — BASIN - MRMC: Brand: MEDLINE INDUSTRIES, INC.

## (undated) DEVICE — SOLIDIFIER FLD 2OZ 1500CC N DISINF IN BTL DISP SAFESORB

## (undated) DEVICE — BLOCK BITE ENDOSCP AD 21 MM W/ DIL BLU LF DISP

## (undated) DEVICE — NEEDLE HYPO 18GA L1.5IN PNK S STL HUB POLYPR SHLD REG BVL

## (undated) DEVICE — Device

## (undated) DEVICE — NEEDLE HYPO 25GA L1.5IN BVL ORIENTED ECLIPSE

## (undated) DEVICE — SYR 10ML LUER LOK 1/5ML GRAD --

## (undated) DEVICE — STERILE POLYISOPRENE POWDER-FREE SURGICAL GLOVES: Brand: PROTEXIS

## (undated) DEVICE — CATH IV AUTOGRD BC PNK 20GA 25 -- INSYTE

## (undated) DEVICE — Z DISCONTINUED PER MEDLINE LINE GAS SAMPLING O2/CO2 LNG AD 13 FT NSL W/ TBNG FILTERLINE

## (undated) DEVICE — BANDAGE,GAUZE,CONFORMING,3"X75",STRL,LF: Brand: MEDLINE

## (undated) DEVICE — SWAB CULT LIQ STUART AGR AERB MOD IN BRK SGL RAYON TIP PLAS 220099] BECTON DICKINSON MICRO]

## (undated) DEVICE — BNDG ELAS HK LOOP 4X5YD NS -- MATRIX

## (undated) DEVICE — SUTURE ETHLN SZ 2-0 L18IN NONABSORBABLE BLK L19MM PS-2 PRIM 593H

## (undated) DEVICE — YANKAUER,TAPERED BULBOUS TIP,W/O VENT: Brand: MEDLINE

## (undated) DEVICE — FORCEPS BX L160CM DIA8MM GRSP DISECT CUP TIP NONLOCKING ROT

## (undated) DEVICE — SYR 3ML LL TIP 1/10ML GRAD --

## (undated) DEVICE — NEONATAL-ADULT SPO2 SENSOR: Brand: NELLCOR

## (undated) DEVICE — TOWEL 4 PLY TISS 19X30 SUE WHT

## (undated) DEVICE — BASIN EMSIS 16OZ GRAPHITE PLAS KID SHP MOLD GRAD FOR ORAL

## (undated) DEVICE — ELECTRODE,RADIOTRANSLUCENT,FOAM,5PK: Brand: MEDLINE

## (undated) DEVICE — STOCKINETTE,DOUBLE PLY,6X54,STERILE: Brand: MEDLINE

## (undated) DEVICE — SOLUTION SCRB 2OZ 10% POVIDONE IOD ANTIMIC BTL

## (undated) DEVICE — TRAY PREP DRY W/ PREM GLV 2 APPL 6 SPNG 2 UNDPD 1 OVERWRAP

## (undated) DEVICE — 1200 GUARD II KIT W/5MM TUBE W/O VAC TUBE: Brand: GUARDIAN

## (undated) DEVICE — CULTURETTE SGL EVAC TUBE PALL -- 100/CA

## (undated) DEVICE — SPONGE GZ W4XL4IN COT 12 PLY TYP VII WVN C FLD DSGN

## (undated) DEVICE — ZIMMER® STERILE DISPOSABLE TOURNIQUET CUFF WITH PROTECTIVE SLEEVE AND PLC, DUAL PORT, SINGLE BLADDER, 18 IN. (46 CM)

## (undated) DEVICE — BANDAGE,GAUZE,BULKEE II,4.5"X4.1YD,STRL: Brand: MEDLINE

## (undated) DEVICE — FORCEPS BX L240CM JAW DIA2.8MM L CAP W/ NDL MIC MESH TOOTH

## (undated) DEVICE — COVER LT HNDL PLAS RIG 1 PER PK